# Patient Record
Sex: FEMALE | Race: WHITE | NOT HISPANIC OR LATINO | Employment: OTHER | URBAN - METROPOLITAN AREA
[De-identification: names, ages, dates, MRNs, and addresses within clinical notes are randomized per-mention and may not be internally consistent; named-entity substitution may affect disease eponyms.]

---

## 2017-02-01 ENCOUNTER — APPOINTMENT (OUTPATIENT)
Dept: VASCULAR SURGERY | Facility: CLINIC | Age: 42
End: 2017-02-01
Payer: COMMERCIAL

## 2017-02-01 PROCEDURE — 99207 ZZC VEINSOLUTIONS NO CHARGE VISIT: CPT | Performed by: SURGERY

## 2017-03-15 ENCOUNTER — TRANSFERRED RECORDS (OUTPATIENT)
Dept: HEALTH INFORMATION MANAGEMENT | Facility: CLINIC | Age: 42
End: 2017-03-15
Payer: COMMERCIAL

## 2017-03-15 ENCOUNTER — APPOINTMENT (OUTPATIENT)
Dept: VASCULAR SURGERY | Facility: CLINIC | Age: 42
End: 2017-03-15
Payer: COMMERCIAL

## 2017-03-15 PROCEDURE — 76942 ECHO GUIDE FOR BIOPSY: CPT | Performed by: SURGERY

## 2017-03-15 PROCEDURE — 36471 NJX SCLRSNT MLT INCMPTNT VN: CPT | Performed by: SURGERY

## 2017-03-27 ENCOUNTER — OFFICE VISIT (OUTPATIENT)
Dept: FAMILY MEDICINE | Facility: CLINIC | Age: 42
End: 2017-03-27

## 2017-03-27 VITALS
OXYGEN SATURATION: 98 % | WEIGHT: 132 LBS | BODY MASS INDEX: 22.53 KG/M2 | HEIGHT: 64 IN | DIASTOLIC BLOOD PRESSURE: 70 MMHG | SYSTOLIC BLOOD PRESSURE: 110 MMHG | HEART RATE: 72 BPM | RESPIRATION RATE: 16 BRPM | TEMPERATURE: 98.3 F

## 2017-03-27 DIAGNOSIS — K42.9 UMBILICAL HERNIA WITHOUT OBSTRUCTION AND WITHOUT GANGRENE: Primary | ICD-10-CM

## 2017-03-27 DIAGNOSIS — Z71.89 ACP (ADVANCE CARE PLANNING): ICD-10-CM

## 2017-03-27 PROCEDURE — 99213 OFFICE O/P EST LOW 20 MIN: CPT | Performed by: FAMILY MEDICINE

## 2017-03-27 NOTE — NURSING NOTE
Patient is here for what she thinks may be a belly button hernia. She first noticed this about 2 wks ago.  Pre-Visit Screening not done today.  Pulse - regular  My Chart - accepts    CLASSIFICATION OF OVERWEIGHT AND OBESITY BY BMI                         Obesity Class           BMI(kg/m2)  Underweight                                    < 18.5  Normal                                         18.5-24.9  Overweight                                     25.0-29.9  OBESITY                     I                  30.0-34.9                              II                 35.0-39.9  EXTREME OBESITY             III                >40                             Patient's  BMI Body mass index is 22.66 kg/(m^2).  http://hin.nhlbi.nih.gov/menuplanner/menu.cgi  Questioned patient about current smoking habits.  Pt. has never smoked.

## 2017-03-27 NOTE — PROGRESS NOTES
SUBJECTIVE: 41 year old female complaining of generalized ache around her umbilicus for 6 week(s).   The patient describes worse when up and active/ gone when she sits or sleeps at night.   The patient denies a history of GI changes, weight change or fever.   Smoking history: No.   Relevant past medical history: positive for type 2 diabetes well controlled/ previous umbilical hernia repair.    OBJECTIVE: The patient appears healthy, alert, no distress, cooperative, smiling and jumps on exam table.   EARS: negative  NOSE/SINUS: Nares normal. Septum midline. Mucosa normal. No drainage or sinus tenderness.   THROAT: normal   NECK:Neck supple. No adenopathy. Thyroid symmetric, normal size,, Carotids without bruits.   CHEST: Clear  ABD: The abdomen is soft without tenderness, guarding, mass or organomegaly. Periumbical mild tenderness with deformity noted below the umbilicus. Bowel sounds are normal. No CVA tenderness or inguinal adenopathy noted.    ASSESSMENT: (K42.9) Umbilical hernia without obstruction and without gangrene  (primary encounter diagnosis)  Comment: options and worrisome symptoms reviewed  Plan: GENERAL SURG ADULT REFERRAL        Consultation at her timing    (Z71.89) ACP (advance care planning)  Plan: I have reviewed the patient's medical history in detail and updated the computerized patient record.

## 2017-03-27 NOTE — MR AVS SNAPSHOT
After Visit Summary   3/27/2017    Muriel Gibson    MRN: [id number]           Patient Information     Date Of Birth          1975        Visit Information        Provider Department      3/27/2017 1:45 PM Debbie Laurent MD [facility]        Today's Diagnoses     Umbilical hernia without obstruction and without gangrene    -  1    ACP (advance care planning)          Care Instructions    Schedule consultation        Follow-ups after your visit        Additional Services     GENERAL SURG ADULT REFERRAL       Your provider has referred you to: [facility]: [facility] [place] [phone]   [email or web address]    Please be aware that coverage of these services is subject to the terms and limitations of your health insurance plan.  Call member services at your health plan with any benefit or coverage questions.      Please bring the following with you to your appointment:    (1) Any X-Rays, CTs or MRIs which have been performed.  Contact the facility where they were done to arrange for  prior to your scheduled appointment.   (2) List of current medications   (3) This referral request   (4) Any documents/labs given to you for this referral                  Your next 10 appointments already scheduled     Apr 26, 2017 10:15 AM CDT   Assessment Injection with No Treatment with Kit Mccloud MD,  VEIN PROCEDURE ROOM 1   [facility]    [address], [address]  [facility] [id number] [phone]              Who to contact     If you have questions or need follow up information about today's clinic visit or your schedule please contact [facility] directly at [phone].  Normal or non-critical lab and imaging results will be communicated to you by MyChart, letter or phone within 4 business days after the clinic has received  "the results. If you do not hear from us within 7 days, please contact the clinic through [employer] or phone. If you have a critical or abnormal lab result, we will notify you by phone as soon as possible.  Submit refill requests through [employer] or call your pharmacy and they will forward the refill request to us. Please allow 3 business days for your refill to be completed.          Additional Information About Your Visit        [email or web address] Information     [employer] gives you secure access to your electronic health record. If you see a primary care provider, you can also send messages to your care team and make appointments. If you have questions, please call your primary care clinic.  If you do not have a primary care provider, please call [phone] and they will assist you.        Care EveryWhere ID     This is your Care EveryWhere ID. This could be used by other organizations to access your [facility] medical records  [id number]        Your Vitals Were     Pulse Temperature Respirations Height Last Period Pulse Oximetry    72 98.3  F (36.8  C) (Oral) 16 1.626 m (5' 4\") 03/06/2017 98%    BMI (Body Mass Index)                   22.66 kg/m2            Blood Pressure from Last 3 Encounters:   03/27/17 110/70   10/12/16 114/70   10/03/16 132/68    Weight from Last 3 Encounters:   03/27/17 59.9 kg (132 lb)   10/12/16 61.5 kg (135 lb 8 oz)   10/03/16 60.2 kg (132 lb 12.8 oz)              We Performed the Following     GENERAL SURG ADULT REFERRAL        Primary Care Provider Office Phone # Fax #    Fredi Gonzalez [phone]       [facility] [address]  [facility] [address]        Thank you!     Thank you for choosing [facility]  for your care. Our goal is always to provide you with excellent care. Hearing back from our patients is one way we can continue to improve our services. Please take a few minutes to complete the written survey that " you may receive in the mail after your visit with us. Thank you!             Your Updated Medication List - Protect others around you: Learn how to safely use, store and throw away your medicines at www.disposemymeds.org.          This list is accurate as of: 3/27/17  2:17 PM.  Always use your most recent med list.                   Brand Name Dispense Instructions for use    insulin lispro 100 UNIT/ML injection    HumaLOG     Inject  Subcutaneous 3 times daily (with meals). 3-5 units after breakfast and dinner, and 1-2 units after lunch per sliding scale BG       NovoLIN N PENFILL 100 UNIT/ML injection   Generic drug:  insulin NPH      Inject 19 Units Subcutaneous 2 times daily. In the morning and at bedtime.       ondansetron 4 MG ODT tab    ZOFRAN ODT    30 tablet    Take 1-2 tablets (4-8 mg) by mouth every 8 hours as needed for nausea       sertraline 50 MG tablet    ZOLOFT     Take 50 mg by mouth daily

## 2017-04-03 ENCOUNTER — OFFICE VISIT (OUTPATIENT)
Dept: SURGERY | Facility: CLINIC | Age: 42
End: 2017-04-03
Payer: COMMERCIAL

## 2017-04-03 VITALS
SYSTOLIC BLOOD PRESSURE: 120 MMHG | DIASTOLIC BLOOD PRESSURE: 80 MMHG | BODY MASS INDEX: 21.99 KG/M2 | HEART RATE: 81 BPM | HEIGHT: 65 IN | OXYGEN SATURATION: 100 % | WEIGHT: 132 LBS

## 2017-04-03 DIAGNOSIS — K42.9 RECURRENT UMBILICAL HERNIA: Primary | ICD-10-CM

## 2017-04-03 PROCEDURE — 99203 OFFICE O/P NEW LOW 30 MIN: CPT | Performed by: SURGERY

## 2017-04-03 ASSESSMENT — ENCOUNTER SYMPTOMS: ABDOMINAL PAIN: 1

## 2017-04-03 NOTE — PROGRESS NOTES
This is a 41-year-old patient known to me from a primary umbilical hernia repair done in 2006.  Over the last several weeks, the patient has noticed a lump just below and to the left of her umbilicus.  She is concerned that her hernia is back, or that she has a new one.  She did also have some right inguinal pain and after that some left inguinal pain.  Her inguinal pain has resolved.  She did not notice any lumps or bulges on either side of the groin.  The patient has had a fairly recent colonoscopy, which was negative.  She also had an upper GI study, which was also negative.  The patient notes that her periumbilical symptoms get worse when she eats a heavy meal.  The lump that she feels below the umbilicus is only intermittently present.    Past Medical History:   Diagnosis Date     Anemia      Diabetes mellitus (H)     Type I since age 25 yrs. old     Palpitations     PACs, PVCs     Past Surgical History:   Procedure Laterality Date     APPENDECTOMY      age 14 yrs.     COLONOSCOPY  9/12/2016    Dr. Blanton [facility]     COLONOSCOPY N/A 9/12/2016    Procedure: COLONOSCOPY;  Surgeon: Tanya Blanton MD;  Location: [facility]     HERNIA REPAIR  2006     Review of systems is negative for nausea or vomiting.  Positive for postprandial periumbilical discomfort.  Negative for fever or chills.    Physical exam:   The patient is in no apparent distress.  Breathing is nonlabored.  The abdomen is soft and generally nontender.  There is some mild fullness below and to the left of the umbilicus.  This does not change significantly with Valsalva.  There is a question of a bulge which increases with coughing, though this is very subtle.  There is a palpably patent umbilical ring, suggesting at least a small recurrence of her umbilical hernia.  The skin appears normal.    Assessment and plan: The patient has a bulge or lump just below and to the left of her previous umbilical primary repair.  It is not entirely clear if this  represents recurrent hernia, a new hernia or perhaps a muscular or fatty lump.  I did offer the patient repair of her recurrent umbilical hernia, though I'm not sure that's causing her acute symptoms.  Her symptoms have really only been significant over the last couple of weeks, and I think reexamine her in two weeks might be helpful.  At this point, I'm not sure that the CT scan would add much, though if the patient's symptoms change or progress, that might be reasonable.  The patient will return to see me in two weeks.    Star Ash MD  [facility]    Please route or send letter to:  Referring Provider

## 2017-04-03 NOTE — MR AVS SNAPSHOT
After Visit Summary   4/3/2017    Muriel Gbison    MRN: [id number]           Patient Information     Date Of Birth          1975        Visit Information        Provider Department      4/3/2017 9:30 AM Star Ash MD [facility]      Today's Diagnoses     Recurrent umbilical hernia    -  1       Follow-ups after your visit        Your next 10 appointments already scheduled     Apr 17, 2017  9:30 AM CDT   Return Visit with Star Ash MD   [facility]    [address]  [facility] [id number] [phone]            Apr 26, 2017 10:15 AM CDT   Assessment Injection with No Treatment with Kit Mccloud MD,  VEIN PROCEDURE ROOM 1   [facility]    [address]  [facility] [id number] [phone]              Who to contact     If you have questions or need follow up information about today's clinic visit or your schedule please contact [facility] directly at [phone].  Normal or non-critical lab and imaging results will be communicated to you by [facility], letter or phone within 4 business days after the clinic has received the results. If you do not hear from us within 7 days, please contact the clinic through [facility] or phone. If you have a critical or abnormal lab result, we will notify you by phone as soon as possible.  Submit refill requests through [employer] or call your pharmacy and they will forward the refill request to us. Please allow 3 business days for your refill to be completed.          Additional Information About Your Visit        MyChart Information     [employer] gives you secure access to your electronic health record. If you see a primary care provider, you can also send messages to your care team and make appointments. If  "you have questions, please call your primary care clinic.  If you do not have a primary care provider, please call [phone] and they will assist you.        Care EveryWhere ID     This is your Care EveryWhere ID. This could be used by other organizations to access your [facility] medical records  [id number]        Your Vitals Were     Pulse Height Last Period Pulse Oximetry Breastfeeding? BMI (Body Mass Index)    81 5' 5\" (1.651 m) 03/06/2017 100% No 21.97 kg/m2       Blood Pressure from Last 3 Encounters:   04/03/17 120/80   03/27/17 110/70   10/12/16 114/70    Weight from Last 3 Encounters:   04/03/17 132 lb (59.9 kg)   03/27/17 132 lb (59.9 kg)   10/12/16 135 lb 8 oz (61.5 kg)              Today, you had the following     No orders found for display       Primary Care Provider Office Phone # Fax #    Fredi Gonzalez [phone]       [facility] [address]  [facility] [address]        Thank you!     Thank you for choosing [facility]  for your care. Our goal is always to provide you with excellent care. Hearing back from our patients is one way we can continue to improve our services. Please take a few minutes to complete the written survey that you may receive in the mail after your visit with us. Thank you!             Your Updated Medication List - Protect others around you: Learn how to safely use, store and throw away your medicines at www.disposemymeds.org.          This list is accurate as of: 4/3/17 11:50 AM.  Always use your most recent med list.                   Brand Name Dispense Instructions for use    insulin lispro 100 UNIT/ML injection    HumaLOG     Inject  Subcutaneous 3 times daily (with meals). 3-5 units after breakfast and dinner, and 1-2 units after lunch per sliding scale BG       NovoLIN N PENFILL 100 UNIT/ML injection   Generic drug:  insulin NPH      Inject 19 Units Subcutaneous 2 times daily. In the " morning and at bedtime.       ondansetron 4 MG ODT tab    ZOFRAN ODT    30 tablet    Take 1-2 tablets (4-8 mg) by mouth every 8 hours as needed for nausea       sertraline 50 MG tablet    ZOLOFT     Take 50 mg by mouth daily

## 2017-04-03 NOTE — LETTER
April 3, 2017    RE:  Muriel Gibson-:  [birth year]    This is a 41-year-old patient known to me from a primary umbilical hernia repair done in . Over the last several weeks, the patient has noticed a lump just below and to the left of her umbilicus. She is concerned that her hernia is back, or that she has a new one. She did also have some right inguinal pain and after that some left inguinal pain. Her inguinal pain has resolved. She did not notice any lumps or bulges on either side of the groin. The patient has had a fairly recent colonoscopy, which was negative. She also had an upper GI study, which was also negative. The patient notes that her periumbilical symptoms get worse when she eats a heavy meal. The lump that she feels below the umbilicus is only intermittently present.     Review of systems is negative for nausea or vomiting. Positive for postprandial periumbilical discomfort. Negative for fever or chills.     Physical exam:   The patient is in no apparent distress.  Breathing is nonlabored.  The abdomen is soft and generally nontender. There is some mild fullness below and to the left of the umbilicus. This does not change significantly with Valsalva. There is a question of a bulge which increases with coughing, though this is very subtle. There is a palpably patent umbilical ring, suggesting at least a small recurrence of her umbilical hernia.  The skin appears normal.     Assessment and plan: The patient has a bulge or lump just below and to the left of her previous umbilical primary repair. It is not entirely clear if this represents recurrent hernia, a new hernia or perhaps a muscular or fatty lump. I did offer the patient repair of her recurrent umbilical hernia, though I'm not sure that's causing her acute symptoms. Her symptoms have really only been significant over the last couple of weeks, and I think reexamine her in two weeks might be helpful. At this point, I'm not sure that the CT scan  would add much, though if the patient's symptoms change or progress, that might be reasonable. The patient will return to see me in two weeks.     Star Ash MD  [facility]

## 2017-04-03 NOTE — PROGRESS NOTES
HPI      ROS (Review of Systems):      Positive for diabetes and DM controlled with Insulin.   GASTROINTESTINAL: Positive for abdominal pain.          Physical Exam

## 2017-04-17 ENCOUNTER — OFFICE VISIT (OUTPATIENT)
Dept: SURGERY | Facility: CLINIC | Age: 42
End: 2017-04-17
Payer: COMMERCIAL

## 2017-04-17 VITALS
SYSTOLIC BLOOD PRESSURE: 100 MMHG | WEIGHT: 132 LBS | HEART RATE: 67 BPM | BODY MASS INDEX: 21.99 KG/M2 | DIASTOLIC BLOOD PRESSURE: 62 MMHG | HEIGHT: 65 IN

## 2017-04-17 DIAGNOSIS — K42.9 RECURRENT UMBILICAL HERNIA: Primary | ICD-10-CM

## 2017-04-17 PROCEDURE — 99212 OFFICE O/P EST SF 10 MIN: CPT | Performed by: SURGERY

## 2017-04-17 NOTE — LETTER
2017    RE:  Muriel Gibson-:  [birth year]    The patient returns to follow-up regarding her periumbilical discomfort. Her inguinal regions have given her no further problems. She continues to have some discomfort below the umbilicus on both sides, worse with eating. She has not noticed any particular bulging at the umbilicus.     Examination today again reveals a very tiny recurrent umbilical hernia. This has a minimal bulge. The remainder of the area where the patient feels some swelling does not appear to be a hernia. This seems more consistent with a muscular issue.     The patient is somewhat concerned about her upcoming trip to [place]. I think it is very unlikely that any of the issue she is describing we'll give her any trouble traveling. With such a small recurrent umbilical hernia, it is certainly reasonable simply to observe this. She may return to see me if she would like to consider having that fixed in the future.     Star Ash MD  [facility]

## 2017-04-17 NOTE — PROGRESS NOTES
The patient returns to follow-up regarding her periumbilical discomfort.  Her inguinal regions have given her no further problems.  She continues to have some discomfort below the umbilicus on both sides, worse with eating.  She has not noticed any particular bulging at the umbilicus.    Examination today again reveals a very tiny recurrent umbilical hernia.  This has a minimal bulge.  The remainder of the area where the patient feels some swelling does not appear to be a hernia.  This seems more consistent with a muscular issue.    The patient is somewhat concerned about her upcoming trip to [place].  I think it is very unlikely that any of the issue she is describing we'll give her any trouble traveling.  With such a small recurrent umbilical hernia, it is certainly reasonable simply to observe this.  She may return to see me if she would like to consider having that fixed in the future.    Star Ash MD  [facility]    Please route or send letter to:  Primary Care Provider (PCP)

## 2017-04-17 NOTE — MR AVS SNAPSHOT
After Visit Summary   4/17/2017    Muriel Gibson    MRN: [id number]           Patient Information     Date Of Birth          1975        Visit Information        Provider Department      4/17/2017 9:30 AM Star Ash MD [facility]      Today's Diagnoses     Recurrent umbilical hernia    -  1       Follow-ups after your visit        Your next 10 appointments already scheduled     Apr 26, 2017 10:15 AM CDT   Assessment Injection with No Treatment with Kit Mccloud MD,  VEIN PROCEDURE ROOM 1   [facility]    [address]  [facility] [address] [phone]              Who to contact     If you have questions or need follow up information about today's clinic visit or your schedule please contact [facility] directly at [phone].  Normal or non-critical lab and imaging results will be communicated to you by MyChart, letter or phone within 4 business days after the clinic has received the results. If you do not hear from us within 7 days, please contact the clinic through [employer] or phone. If you have a critical or abnormal lab result, we will notify you by phone as soon as possible.  Submit refill requests through [employer] or call your pharmacy and they will forward the refill request to us. Please allow 3 business days for your refill to be completed.          Additional Information About Your Visit        MyChart Information     [employer] gives you secure access to your electronic health record. If you see a primary care provider, you can also send messages to your care team and make appointments. If you have questions, please call your primary care clinic.  If you do not have a primary care provider, please call [phone] and they will assist you.        Care EveryWhere ID     This is your Care EveryWhere ID. This  "could be used by other organizations to access your [facility] medical records  [id number]        Your Vitals Were     Pulse Height Last Period BMI (Body Mass Index)          67 5' 5\" (1.651 m) 03/06/2017 21.97 kg/m2         Blood Pressure from Last 3 Encounters:   04/17/17 100/62   04/03/17 120/80   03/27/17 110/70    Weight from Last 3 Encounters:   04/17/17 132 lb (59.9 kg)   04/03/17 132 lb (59.9 kg)   03/27/17 132 lb (59.9 kg)              Today, you had the following     No orders found for display       Primary Care Provider Office Phone # Fax #    Fredi Gonzalez [phone]       [facility] [address] [facility] [address]  [facility] [address]        Thank you!     Thank you for choosing [facility]  for your care. Our goal is always to provide you with excellent care. Hearing back from our patients is one way we can continue to improve our services. Please take a few minutes to complete the written survey that you may receive in the mail after your visit with us. Thank you!             Your Updated Medication List - Protect others around you: Learn how to safely use, store and throw away your medicines at www.disposemymeds.org.          This list is accurate as of: 4/17/17 10:55 AM.  Always use your most recent med list.                   Brand Name Dispense Instructions for use    insulin lispro 100 UNIT/ML injection    HumaLOG     Inject  Subcutaneous 3 times daily (with meals). 3-5 units after breakfast and dinner, and 1-2 units after lunch per sliding scale BG       NovoLIN N PENFILL 100 UNIT/ML injection   Generic drug:  insulin NPH      Inject 19 Units Subcutaneous 2 times daily. In the morning and at bedtime.       ondansetron 4 MG ODT tab    ZOFRAN ODT    30 tablet    Take 1-2 tablets (4-8 mg) by mouth every 8 hours as needed for nausea       sertraline 50 MG tablet    ZOLOFT     Take 50 mg by mouth daily         "

## 2017-04-19 ENCOUNTER — APPOINTMENT (OUTPATIENT)
Dept: VASCULAR SURGERY | Facility: CLINIC | Age: 42
End: 2017-04-19
Payer: COMMERCIAL

## 2017-04-19 ENCOUNTER — TRANSFERRED RECORDS (OUTPATIENT)
Dept: HEALTH INFORMATION MANAGEMENT | Facility: CLINIC | Age: 42
End: 2017-04-19

## 2017-04-19 PROCEDURE — 93971 EXTREMITY STUDY: CPT | Performed by: SURGERY

## 2017-04-19 PROCEDURE — 99207 ZZC VEINSOLUTIONS POST OPERATIVE VISIT: CPT | Performed by: SURGERY

## 2017-04-26 ENCOUNTER — APPOINTMENT (OUTPATIENT)
Dept: VASCULAR SURGERY | Facility: CLINIC | Age: 42
End: 2017-04-26

## 2017-04-26 PROCEDURE — 99207 ZZC VEINSOLUTIONS NO CHARGE VISIT: CPT | Performed by: SURGERY

## 2017-06-19 ENCOUNTER — APPOINTMENT (OUTPATIENT)
Dept: VASCULAR SURGERY | Facility: CLINIC | Age: 42
End: 2017-06-19

## 2017-06-19 PROCEDURE — A6533 GC STOCKING THIGHLNGTH 18-30: HCPCS | Performed by: SURGERY

## 2017-06-19 PROCEDURE — 99207 ZZC VEINSOLUTIONS NO CHARGE VISIT: CPT | Performed by: SURGERY

## 2017-07-01 ENCOUNTER — HEALTH MAINTENANCE LETTER (OUTPATIENT)
Age: 42
End: 2017-07-01

## 2017-11-21 ENCOUNTER — APPOINTMENT (OUTPATIENT)
Dept: VASCULAR SURGERY | Facility: CLINIC | Age: 42
End: 2017-11-21
Payer: COMMERCIAL

## 2017-11-21 ENCOUNTER — OFFICE VISIT (OUTPATIENT)
Dept: VASCULAR SURGERY | Facility: CLINIC | Age: 42
End: 2017-11-21
Payer: COMMERCIAL

## 2017-11-21 ENCOUNTER — TRANSFERRED RECORDS (OUTPATIENT)
Dept: HEALTH INFORMATION MANAGEMENT | Facility: CLINIC | Age: 42
End: 2017-11-21

## 2017-11-21 DIAGNOSIS — Z53.9 ERRONEOUS ENCOUNTER--DISREGARD: Primary | ICD-10-CM

## 2017-11-21 PROCEDURE — 99207 ZZC VEINSOLUTIONS NO CHARGE VISIT: CPT | Performed by: SURGERY

## 2017-11-21 PROCEDURE — 93971 EXTREMITY STUDY: CPT | Performed by: SURGERY

## 2017-11-21 PROCEDURE — 36471 NJX SCLRSNT MLT INCMPTNT VN: CPT | Mod: 50 | Performed by: SURGERY

## 2017-11-21 NOTE — MR AVS SNAPSHOT
After Visit Summary   11/21/2017    Muriel Gibson    MRN: [id number]           Patient Information     Date Of Birth          1975        Visit Information        Provider Department      11/21/2017 11:15 AM Kit Mccloud MD [facility]      Today's Diagnoses     ERRONEOUS ENCOUNTER--DISREGARD    -  1       Follow-ups after your visit        Who to contact     If you have questions or need follow up information about today's clinic visit or your schedule please contact [facility] directly at [phone].  Normal or non-critical lab and imaging results will be communicated to you by TotalTakeouthart, letter or phone within 4 business days after the clinic has received the results. If you do not hear from us within 7 days, please contact the clinic through TotalTakeouthart or phone. If you have a critical or abnormal lab result, we will notify you by phone as soon as possible.  Submit refill requests through Tailor Made Oil or call your pharmacy and they will forward the refill request to us. Please allow 3 business days for your refill to be completed.          Additional Information About Your Visit        MyChart Information     T[employer]or Made Oil gives you secure access to your electronic health record. If you see a primary care provider, you can also send messages to your care team and make appointments. If you have questions, please call your primary care clinic.  If you do not have a primary care provider, please call [phone] and they will assist you.        Care EveryWhere ID     This is your Care EveryWhere ID. This could be used by other organizations to access your [place] medical records  [id number]         Blood Pressure from Last 3 Encounters:   01/17/18 116/70   12/19/17 (!) 132/92   12/15/17 116/74    Weight from Last 3 Encounters:   01/17/18 64 kg (141 lb 3.2 oz)   12/19/17 62.3 kg (137 lb 6.4 oz)   12/15/17 62.7 kg  (138 lb 3.2 oz)              Today, you had the following     No orders found for display       Primary Care Provider Office Phone # Fax #    Fredi Gonzalez [phone] [address] [address]        Equal Access to Services     [facility]: Hadii aad ku hadasho Soomaali, waaxda luqadaha, qaybta kaalmada adeegyada, waxay marleniin hayaan gold juliarobert laleo . So [facility] [phone].    ATENCIÓN: Si habla español, tiene a albrecht disposición servicios gratuitos de asistencia lingüística. [facility] [phone].    We comply with applicable federal civil rights laws and [place] laws. We do not discriminate on the basis of race, color, national origin, age, disability, sex, sexual orientation, or gender identity.            Thank you!     Thank you for choosing [facility]  for your care. Our goal is always to provide you with excellent care. Hearing back from our patients is one way we can continue to improve our services. Please take a few minutes to complete the written survey that you may receive in the mail after your visit with us. Thank you!             Your Updated Medication List - Protect others around you: Learn how to safely use, store and throw away your medicines at www.disposemymeds.org.          This list is accurate as of 11/21/17 11:59 PM.  Always use your most recent med list.                   Brand Name Dispense Instructions for use Diagnosis    insulin lispro 100 UNIT/ML injection    HumaLOG     Inject  Subcutaneous 3 times daily (with meals). 3-5 units after breakfast and dinner, and 1-2 units after lunch per sliding scale BG        NovoLIN N PENFILL 100 UNIT/ML injection   Generic drug:  insulin NPH      Inject 19 Units Subcutaneous 2 times daily. In the morning and at bedtime.

## 2017-11-21 NOTE — PROGRESS NOTES
[facility] Procedure Note    Preprocedure diagnosis  Residual bilateral varicose veins with leg pain; status post radiofrequency ablation right great saphenous vein    Postprocedure diagnosis  Same    Procedure  Medically necessary, bilateral lower extremity sclerotherapy    Surgeon  MELISSA Mccloud M.D.    Procedure description  Details of the procedure including risks of allergic reaction, ulceration, hyperpigmentation, the vein thrombosis and matting had been discussed.  Patient appeared to understand and wishes to proceed.  Informed consent was obtained.    I had  lie supine and prone on our examining table and I injected numerous reticular veins and telangiectasias about both lateral legs, lateral thighs and to a lesser extent medial legs and thighs as well as the popliteal fossa is bilaterally using 0.5% Polidocanol foam, mixed in a one part Polidocanol in two parts air configuration..  We used a total of 12 mL of foam to treat to treat numerous bilateral telangiectasias and reticular veins.    After we completed the treatment, we cleaned the legs with saline solution and applied thigh-high compression hose.  We had her walk for 15 minutes prior to driving home.  She tolerated the procedure well without evidence of complications.    She will return in follow-up in one month.    MELISSA Mccloud M.D.

## 2017-12-11 ENCOUNTER — HOSPITAL ENCOUNTER (OUTPATIENT)
Dept: MAMMOGRAPHY | Facility: CLINIC | Age: 42
Discharge: HOME | End: 2017-12-11
Attending: OBSTETRICS & GYNECOLOGY | Admitting: OBSTETRICS & GYNECOLOGY
Payer: COMMERCIAL

## 2017-12-11 DIAGNOSIS — Z12.31 VISIT FOR SCREENING MAMMOGRAM: ICD-10-CM

## 2017-12-11 PROCEDURE — G0202 SCR MAMMO BI INCL CAD: HCPCS

## 2017-12-15 ENCOUNTER — TELEPHONE (OUTPATIENT)
Dept: FAMILY MEDICINE | Facility: CLINIC | Age: 42
End: 2017-12-15

## 2017-12-15 ENCOUNTER — OFFICE VISIT (OUTPATIENT)
Dept: FAMILY MEDICINE | Facility: CLINIC | Age: 42
End: 2017-12-15

## 2017-12-15 VITALS
HEART RATE: 68 BPM | WEIGHT: 138.2 LBS | BODY MASS INDEX: 23.6 KG/M2 | DIASTOLIC BLOOD PRESSURE: 74 MMHG | HEIGHT: 64 IN | SYSTOLIC BLOOD PRESSURE: 116 MMHG | OXYGEN SATURATION: 99 % | TEMPERATURE: 98.7 F

## 2017-12-15 DIAGNOSIS — R06.2 WHEEZING: Primary | ICD-10-CM

## 2017-12-15 PROCEDURE — 99213 OFFICE O/P EST LOW 20 MIN: CPT | Performed by: FAMILY MEDICINE

## 2017-12-15 RX ORDER — ALBUTEROL SULFATE 90 UG/1
2 AEROSOL, METERED RESPIRATORY (INHALATION) EVERY 6 HOURS PRN
Qty: 3 INHALER | Refills: 1 | Status: SHIPPED | OUTPATIENT
Start: 2017-12-15 | End: 2018-11-16

## 2017-12-15 NOTE — MR AVS SNAPSHOT
After Visit Summary   12/15/2017    Muriel Gibson    MRN: [id number]           Patient Information     Date Of Birth          1975        Visit Information        Provider Department      12/15/2017 1:00 PM Fredi Gonzalez MD [facility]        Today's Diagnoses     Wheezing    -  1       Follow-ups after your visit        Your next 10 appointments already scheduled     Dec 27, 2017 10:00 AM CST   Assessment Injection with Possible Treatment with Kit Mccloud MD   [facility]    [address] [place], [address]  [facility] [address] [phone]              Who to contact     If you have questions or need follow up information about today's clinic visit or your schedule please contact [facility] directly at [phone].  Normal or non-critical lab and imaging results will be communicated to you by MyChart, letter or phone within 4 business days after the clinic has received the results. If you do not hear from us within 7 days, please contact the clinic through NIMBOXXhart or phone. If you have a critical or abnormal lab result, we will notify you by phone as soon as possible.  Submit refill requests through Company Data Trees or call your pharmacy and they will forward the refill request to us. Please allow 3 business days for your refill to be completed.          Additional Information About Your Visit        MyChart Information     [employer] gives you secure access to your electronic health record. If you see a primary care provider, you can also send messages to your care team and make appointments. If you have questions, please call your primary care clinic.  If you do not have a primary care provider, please call [phone] and they will assist you.        Care EveryWhere ID     This is your Care EveryWhere ID. This could be used by other organizations to access your [place]  "medical records  [id number]        Your Vitals Were     Pulse Temperature Height Pulse Oximetry Breastfeeding? BMI (Body Mass Index)    68 98.7  F (37.1  C) (Oral) 1.626 m (5' 4\") 99% No 23.72 kg/m2       Blood Pressure from Last 3 Encounters:   12/15/17 116/74   04/17/17 100/62   04/03/17 120/80    Weight from Last 3 Encounters:   12/15/17 62.7 kg (138 lb 3.2 oz)   04/17/17 59.9 kg (132 lb)   04/03/17 59.9 kg (132 lb)              Today, you had the following     No orders found for display         Today's Medication Changes          These changes are accurate as of: 12/15/17  1:26 PM.  If you have any questions, ask your nurse or doctor.               Start taking these medicines.        Dose/Directions    albuterol 108 (90 BASE) MCG/ACT Inhaler   Commonly known as:  PROAIR HFA/PROVENTIL HFA/VENTOLIN HFA   Used for:  Wheezing   Started by:  Fredi Gonzalez MD        Dose:  2 puff   Inhale 2 puffs into the lungs every 6 hours as needed for shortness of breath / dyspnea or wheezing   Quantity:  3 Inhaler   Refills:  1            Where to get your medicines      These medications were sent to [facility] [address] [place] [address] AT [place] [address] & [address] [address]    Hours:  24-hours Phone:  [phone]     albuterol 108 (90 BASE) MCG/ACT Inhaler                Primary Care Provider Office Phone # Fax #    Fredi Gonzalez [phone] [address] [address]        Equal Access to Services     [facility]: Lisa Franks, waliv lulinda, milli kaalmada sathish, cherelle snell. So [facility] [phone].    ATENCIÓN: Si habla español, tiene a albrecht disposición servicios gratuitos de asistencia lingüística. Llame al [phone].    We comply with applicable federal civil rights laws and [place] laws. We do not discriminate on the basis of " race, color, national origin, age, disability, sex, sexual orientation, or gender identity.            Thank you!     Thank you for choosing [facility]  for your care. Our goal is always to provide you with excellent care. Hearing back from our patients is one way we can continue to improve our services. Please take a few minutes to complete the written survey that you may receive in the mail after your visit with us. Thank you!             Your Updated Medication List - Protect others around you: Learn how to safely use, store and throw away your medicines at www.disposemymeds.org.          This list is accurate as of: 12/15/17  1:26 PM.  Always use your most recent med list.                   Brand Name Dispense Instructions for use Diagnosis    albuterol 108 (90 BASE) MCG/ACT Inhaler    PROAIR HFA/PROVENTIL HFA/VENTOLIN HFA    3 Inhaler    Inhale 2 puffs into the lungs every 6 hours as needed for shortness of breath / dyspnea or wheezing    Wheezing       insulin lispro 100 UNIT/ML injection    HumaLOG     Inject  Subcutaneous 3 times daily (with meals). 3-5 units after breakfast and dinner, and 1-2 units after lunch per sliding scale BG        NovoLIN N PENFILL 100 UNIT/ML injection   Generic drug:  insulin NPH      Inject 19 Units Subcutaneous 2 times daily. In the morning and at bedtime.        ondansetron 4 MG ODT tab    ZOFRAN ODT    30 tablet    Take 1-2 tablets (4-8 mg) by mouth every 8 hours as needed for nausea    Nausea       sertraline 50 MG tablet    ZOLOFT     Take 50 mg by mouth daily

## 2017-12-15 NOTE — TELEPHONE ENCOUNTER
Medical records request to [facility].. Request faxed 12/15/17. Waiting on records

## 2017-12-15 NOTE — NURSING NOTE
Muriel is here for breathing issues she has had since a recent cold, pt states she has been getting light headed and dizzy, and has felt pressure in her chest, pt also states she had borderline asthma but hasnt seen her asthma dr in 5 years    Pre-Visit Screening :  Immunizations : not up to date - pt would like to postpone her vaccinations until spring    Colonoscopy : na  Mammogram : is up to date  Asthma Action Test/Plan : given act  PHQ9/GAD7 :  Na    Pulse - regular  My Chart - accepts    CLASSIFICATION OF OVERWEIGHT AND OBESITY BY BMI                         Obesity Class           BMI(kg/m2)  Underweight                                    < 18.5  Normal                                         18.5-24.9  Overweight                                     25.0-29.9  OBESITY                     I                  30.0-34.9                              II                 35.0-39.9  EXTREME OBESITY             III                >40                             Patient's  BMI Body mass index is 22.15 kg/(m^2).  http://hin.nhlbi.nih.gov/menuplanner/menu.cgi  Questioned patient about current smoking habits.  Pt. has never smoked.  The patient has verbalized that it is ok to leave a detailed voice message on the patient's cell phone with results/recommendations from this visit.       Verified [phone] phone number:

## 2017-12-15 NOTE — PROGRESS NOTES
SUBJECTIVE:   Muriel Gibson is a 42 year old female who complains of chest tightness and wheezing for 6-7 days. She had a cold with nasal congestion prior but most of those symptoms have resolved. She denies a history of productive cough, sweats, chills, myalgias and vomiting and admits to a history of very mild intermittent asthma-saw allergy/asthma 6 years ago or so- was told very mild asthma-has not needed meds since. . Patient does not smoke cigarettes.     OBJECTIVE:  She appears well, vital signs are as noted by the nurse. Ears normal.  Throat and pharynx normal.  Neck supple. No adenopathy in the neck. Nose is congested. Sinuses non tender. The chest is clear, without wheezes or rales.    ASSESSMENT:   Bronchospasm and Viral upper respiratory illness-pt with hx asthma so likely has had some bronchospasm    PLAN:pt recommended to go back to prn albuterol use, call if not better in next few days    patient given instructions to go to emergency department immediately if worsening of symptoms and verbalizes this understanding       Symptomatic therapy suggested: push fluids and rest. Call or return to clinic prn if these symptoms worsen or fail to improve as anticipated.

## 2017-12-16 ASSESSMENT — ASTHMA QUESTIONNAIRES: ACT_TOTALSCORE: 11

## 2017-12-18 ENCOUNTER — TELEPHONE (OUTPATIENT)
Dept: FAMILY MEDICINE | Facility: CLINIC | Age: 42
End: 2017-12-18

## 2017-12-18 NOTE — TELEPHONE ENCOUNTER
No appt available with [facility] for Tuesday  Pt scheduled with [facility] tomorrow  Emperatriz  [phone] (home) none (work)

## 2017-12-18 NOTE — TELEPHONE ENCOUNTER
With pain I do worry about pneumonia-  I would prefer she be seen for CXR and vitals check including O2 sats-need to know what we are treating    Please inform patient that I am concerned by these symptoms -do not feel comfortable just calling something in

## 2017-12-18 NOTE — TELEPHONE ENCOUNTER
pt called the clinical support line with the following;    Pt saw you last Friday. Pt called to say that she is not feeling any better. Pt states that it hurts in her chest/ front and back. Pt states that she is coughing up phelgm. I encouraged pt to make an appt today, pt refused. Pt aware that you are gone today and insisted I send a note to you. Pt states that you had told that if she was not feeling better, to call the office and you would prescribe a medication for her. Please call pt if she needs to be seen.   Emperatriz  [phone] (home) none (work)    Please send to nurse if you need further assistance, for I will be gone the rest of the week

## 2017-12-19 ENCOUNTER — OFFICE VISIT (OUTPATIENT)
Dept: FAMILY MEDICINE | Facility: CLINIC | Age: 42
End: 2017-12-19

## 2017-12-19 VITALS
OXYGEN SATURATION: 99 % | WEIGHT: 137.4 LBS | TEMPERATURE: 98.3 F | SYSTOLIC BLOOD PRESSURE: 132 MMHG | BODY MASS INDEX: 23.46 KG/M2 | HEART RATE: 95 BPM | HEIGHT: 64 IN | DIASTOLIC BLOOD PRESSURE: 92 MMHG

## 2017-12-19 DIAGNOSIS — J20.9 ACUTE BRONCHITIS, UNSPECIFIED ORGANISM: Primary | ICD-10-CM

## 2017-12-19 PROCEDURE — 99213 OFFICE O/P EST LOW 20 MIN: CPT | Performed by: PHYSICIAN ASSISTANT

## 2017-12-19 RX ORDER — BENZONATATE 100 MG/1
100 CAPSULE ORAL 3 TIMES DAILY PRN
Qty: 30 CAPSULE | Refills: 0 | Status: SHIPPED | OUTPATIENT
Start: 2017-12-19 | End: 2018-01-17

## 2017-12-19 RX ORDER — AMOXICILLIN 500 MG/1
500 CAPSULE ORAL 2 TIMES DAILY
Qty: 20 CAPSULE | Refills: 0 | Status: SHIPPED | OUTPATIENT
Start: 2017-12-19 | End: 2017-12-29

## 2017-12-19 NOTE — MR AVS SNAPSHOT
After Visit Summary   12/19/2017    Muriel Gibson    MRN: [id number]           Patient Information     Date Of Birth          1975        Visit Information        Provider Department      12/19/2017 10:30 AM Fernanda Claudio PA-C [facility]        Today's Diagnoses     Acute bronchitis, unspecified organism    -  1       Follow-ups after your visit        Follow-up notes from your care team     Return if symptoms worsen or fail to improve.      Your next 10 appointments already scheduled     Dec 27, 2017 10:00 AM CST   Assessment Injection with Possible Treatment with Kit Mccloud MD   [facility]    [address], [address]  [facility] [address] [phone]              Who to contact     If you have questions or need follow up information about today's clinic visit or your schedule please contact [facility] directly at [phone].  Normal or non-critical lab and imaging results will be communicated to you by Saratahart, letter or phone within 4 business days after the clinic has received the results. If you do not hear from us within 7 days, please contact the clinic through Hyper Urban Level User Swedent or phone. If you have a critical or abnormal lab result, we will notify you by phone as soon as possible.  Submit refill requests through [email or web address] or call your pharmacy and they will forward the refill request to us. Please allow 3 business days for your refill to be completed.          Additional Information About Your Visit        MyChart Information     [email or web address] gives you secure access to your electronic health record. If you see a primary care provider, you can also send messages to your care team and make appointments. If you have questions, please call your primary care clinic.  If you do not have a primary care provider, please call [phone] and they will assist you.        Care  "EveryWhere ID     This is your Care EveryWhere ID. This could be used by other organizations to access your [place] medical records  [id number]        Your Vitals Were     Pulse Temperature Height Last Period Pulse Oximetry Breastfeeding?    95 98.3  F (36.8  C) (Oral) 1.626 m (5' 4\") 11/25/2017 (Exact Date) 99% No    BMI (Body Mass Index)                   23.58 kg/m2            Blood Pressure from Last 3 Encounters:   12/19/17 (!) 132/92   12/15/17 116/74   04/17/17 100/62    Weight from Last 3 Encounters:   12/19/17 62.3 kg (137 lb 6.4 oz)   12/15/17 62.7 kg (138 lb 3.2 oz)   04/17/17 59.9 kg (132 lb)              Today, you had the following     No orders found for display         Today's Medication Changes          These changes are accurate as of: 12/19/17  6:23 PM.  If you have any questions, ask your nurse or doctor.               Start taking these medicines.        Dose/Directions    amoxicillin 500 MG capsule   Commonly known as:  AMOXIL   Used for:  Acute bronchitis, unspecified organism   Started by:  Fernanda Claudio PA-C        Dose:  500 mg   Take 1 capsule (500 mg) by mouth 2 times daily for 10 days   Quantity:  20 capsule   Refills:  0       benzonatate 100 MG capsule   Commonly known as:  TESSALON   Used for:  Acute bronchitis, unspecified organism   Started by:  Fernanda Claudio PA-C        Dose:  100 mg   Take 1 capsule (100 mg) by mouth 3 times daily as needed   Quantity:  30 capsule   Refills:  0            Where to get your medicines      These medications were sent to [facility] [address] [facility] [address] AT [facility] [address] & [address], [facility] [address]    Hours:  24-hours Phone:  [phone]     amoxicillin 500 MG capsule    benzonatate 100 MG capsule                Primary Care Provider Office Phone # Fax #    Fredi Gonzalez [phone] [address] [address]   "      Equal Access to Services     [facility] : Hadii kaushik singh lisydeneen Golden, wahoangda luqadaha, qaybta katierneycherelle krishnamurthy. So [facility] [phone].    ATENCIÓN: Si habla español, tiene a albrecht disposición servicios gratuitos de asistencia lingüística. Llame al [phone].    We comply with applicable federal civil rights laws and [place] laws. We do not discriminate on the basis of race, color, national origin, age, disability, sex, sexual orientation, or gender identity.            Thank you!     Thank you for choosing [facility]  for your care. Our goal is always to provide you with excellent care. Hearing back from our patients is one way we can continue to improve our services. Please take a few minutes to complete the written survey that you may receive in the mail after your visit with us. Thank you!             Your Updated Medication List - Protect others around you: Learn how to safely use, store and throw away your medicines at www.disposemymeds.org.          This list is accurate as of: 12/19/17  6:23 PM.  Always use your most recent med list.                   Brand Name Dispense Instructions for use Diagnosis    albuterol 108 (90 BASE) MCG/ACT Inhaler    PROAIR HFA/PROVENTIL HFA/VENTOLIN HFA    3 Inhaler    Inhale 2 puffs into the lungs every 6 hours as needed for shortness of breath / dyspnea or wheezing    Wheezing       amoxicillin 500 MG capsule    AMOXIL    20 capsule    Take 1 capsule (500 mg) by mouth 2 times daily for 10 days    Acute bronchitis, unspecified organism       benzonatate 100 MG capsule    TESSALON    30 capsule    Take 1 capsule (100 mg) by mouth 3 times daily as needed    Acute bronchitis, unspecified organism       insulin lispro 100 UNIT/ML injection    HumaLOG     Inject  Subcutaneous 3 times daily (with meals). 3-5 units after breakfast and dinner, and 1-2 units after lunch per sliding scale BG        NovoLIN N  PENFILL 100 UNIT/ML injection   Generic drug:  insulin NPH      Inject 19 Units Subcutaneous 2 times daily. In the morning and at bedtime.        ondansetron 4 MG ODT tab    ZOFRAN ODT    30 tablet    Take 1-2 tablets (4-8 mg) by mouth every 8 hours as needed for nausea    Nausea       sertraline 50 MG tablet    ZOLOFT     Take 50 mg by mouth daily

## 2017-12-19 NOTE — PROGRESS NOTES
"CC: Cough    History:  2-3 weeks ago, Muriel developed a cold with chest congestion. Was using Mucinex. Saw Dr. Gonzalez 12/15/2017, and was thought to be mostly asthma. Prescribed albuterol inhaler, which she has been trying, and cough seems to be getting worse. Mostly dry coughs during the day, but at night has a lot of drainage, which leads to wet cough. No nasal congestion, no facial pain. Is still using using Mucinex DM, without relief. No fever, sweats, chills. In general does not need albuterol at all when healthy. Nobody around her with these symptoms.     PMH, MEDICATIONS, ALLERGIES, SOCIAL AND FAMILY HISTORY in [facility] and reviewed by me personally.      ROS negative other than the symptoms noted above in the HPI.        Examination   BP (!) 132/92 (BP Location: Right arm, Patient Position: Chair, Cuff Size: Adult Regular)  Pulse 95  Temp 98.3  F (36.8  C) (Oral)  Ht 1.626 m (5' 4\")  Wt 62.3 kg (137 lb 6.4 oz)  LMP 11/25/2017 (Exact Date)  SpO2 99%  Breastfeeding? No  BMI 23.58 kg/m2       Constitutional: Sitting comfortably, in no acute distress. Vital signs noted. BP mildly elevated  Eyes: pupils equal round reactive to light and accomodation, extra ocular movements intact  Ears: external canals and TMs free of abnormalities  Nose: patent, without mucosal abnormalities  Mouth and throat: without erythema or lesions of the mucosa  Neck:  no adenopathy, trachea midline and normal to palpation  Cardiovascular:  regular rate and rhythm, no murmurs, clicks, or gallops  Respiratory:  normal respiratory rate and rhythm, lungs clear to auscultation  SKIN: No jaundice/pallor/rash.   Psychiatric: mentation appears normal and affect normal/bright        A/P    ICD-10-CM    1. Acute bronchitis, unspecified organism J20.9 benzonatate (TESSALON) 100 MG capsule     amoxicillin (AMOXIL) 500 MG capsule       DISCUSSION: Elected not to do CXR as lungs sound clear, and likely will not change treatment plan. Muriel is " hesitant to try doxycycline for sinus/bronchitis, as she has not taken that before, instead would like to do amoxicillin which she has done in the past. Warned her that amoxicillin will help sinus bacteria, but may not be helping if this is bacteria bronchitis. She understands this. Warned of side effects, and take with food. Recommended trial of Tessalon perles to help with cough. Warned of drowsiness. Contact me in 1 week if not significantly better.     follow up visit: As needed    Fernanda Claudio PA-C  [facility]

## 2017-12-27 ENCOUNTER — APPOINTMENT (OUTPATIENT)
Dept: VASCULAR SURGERY | Facility: CLINIC | Age: 42
End: 2017-12-27
Payer: COMMERCIAL

## 2017-12-27 ENCOUNTER — TELEPHONE (OUTPATIENT)
Dept: FAMILY MEDICINE | Facility: CLINIC | Age: 42
End: 2017-12-27

## 2017-12-27 PROCEDURE — 99207 ZZC VEINSOLUTIONS NO CHARGE VISIT: CPT | Performed by: SURGERY

## 2017-12-27 NOTE — TELEPHONE ENCOUNTER
Muriel called CS today and requested a note for her to have to take with her for jury duty. Pt said she needed a note for her albuterol inhaler so she could have it with her.    Is requesting either Dr. Gonzalez or Fernanda to write it and call her back when its complete at either [phone] or [phone]

## 2017-12-27 NOTE — LETTER
[facility]  [facility] [address]  [place]  [address]    December 27, 2017        RE: Muriel Gibson  [address] [facility] [address]    Muriel Gibson is followed for her medical needs at this clinic.  She will need to carry an albuterol inhaler with her to jury duty.    Please feel free to contact our office if you have further questions.        CORTES Gonzalez M.D.

## 2018-01-04 ENCOUNTER — TRANSFERRED RECORDS (OUTPATIENT)
Dept: FAMILY MEDICINE | Facility: CLINIC | Age: 43
End: 2018-01-04

## 2018-01-17 ENCOUNTER — OFFICE VISIT (OUTPATIENT)
Dept: FAMILY MEDICINE | Facility: CLINIC | Age: 43
End: 2018-01-17

## 2018-01-17 VITALS
BODY MASS INDEX: 24.24 KG/M2 | HEART RATE: 73 BPM | TEMPERATURE: 98.2 F | OXYGEN SATURATION: 98 % | DIASTOLIC BLOOD PRESSURE: 70 MMHG | WEIGHT: 141.2 LBS | SYSTOLIC BLOOD PRESSURE: 116 MMHG

## 2018-01-17 DIAGNOSIS — R07.1 PAINFUL RESPIRATION: ICD-10-CM

## 2018-01-17 DIAGNOSIS — R05.9 COUGH: Primary | ICD-10-CM

## 2018-01-17 PROCEDURE — 99213 OFFICE O/P EST LOW 20 MIN: CPT | Performed by: FAMILY MEDICINE

## 2018-01-17 PROCEDURE — 71046 X-RAY EXAM CHEST 2 VIEWS: CPT | Performed by: FAMILY MEDICINE

## 2018-01-17 RX ORDER — BENZONATATE 100 MG/1
100 CAPSULE ORAL 3 TIMES DAILY PRN
Qty: 30 CAPSULE | Refills: 0 | Status: SHIPPED | OUTPATIENT
Start: 2018-01-17 | End: 2018-11-16

## 2018-01-17 NOTE — NURSING NOTE
Muriel is here for chest pain radiating to her back with some SOB    Pre-visit Screening:  Immunizations:  unknown  Colonoscopy:  NA  Mammogram: NA  Asthma Action Test/Plan:  Borderline  PHQ9:  NA  GAD7:  NA  Questioned patient about current smoking habits Pt. has never smoked.  Ok to leave detailed message on voice mail for today's visit only Yes, phone # [phone] Home

## 2018-01-17 NOTE — MR AVS SNAPSHOT
After Visit Summary   1/17/2018    Muriel Gibson    MRN: [id number]           Patient Information     Date Of Birth          1975        Visit Information        Provider Department      1/17/2018 2:15 PM Fredi Gonzalez MD [facility]        Today's Diagnoses     Cough    -  1    Painful respiration           Follow-ups after your visit        Who to contact     If you have questions or need follow up information about today's clinic visit or your schedule please contact [facility] directly at [phone].  Normal or non-critical lab and imaging results will be communicated to you by [employer], letter or phone within 4 business days after the clinic has received the results. If you do not hear from us within 7 days, please contact the clinic through [employer] or phone. If you have a critical or abnormal lab result, we will notify you by phone as soon as possible.  Submit refill requests through [employer] or call your pharmacy and they will forward the refill request to us. Please allow 3 business days for your refill to be completed.          Additional Information About Your Visit        MyChart Information     [employer] gives you secure access to your electronic health record. If you see a primary care provider, you can also send messages to your care team and make appointments. If you have questions, please call your primary care clinic.  If you do not have a primary care provider, please call [phone] and they will assist you.        Care EveryWhere ID     This is your Care EveryWhere ID. This could be used by other organizations to access your [place] medical records  [id number]        Your Vitals Were     Pulse Temperature Last Period Pulse Oximetry BMI (Body Mass Index)       73 98.2  F (36.8  C) (Oral) 11/25/2017 (Exact Date) 98% 24.24 kg/m2        Blood Pressure from Last 3 Encounters:   01/17/18 116/70   12/19/17 (!) 132/92    12/15/17 116/74    Weight from Last 3 Encounters:   01/17/18 64 kg (141 lb 3.2 oz)   12/19/17 62.3 kg (137 lb 6.4 oz)   12/15/17 62.7 kg (138 lb 3.2 oz)              We Performed the Following     HC XRAY CHEST, 2 VIEWS          Where to get your medicines      These medications were sent to [facility] [address] [address] AT [facility] [address], [facility] [address]    Hours:  24-hours Phone:  [phone]     benzonatate 100 MG capsule          Primary Care Provider Office Phone # Fax #    Fredi Gonzalez [phone] [address] [address]        Equal Access to Services     KRANTHI FRANCOIS : Hadii kaushik ku hadasho Soomaali, waaxda luqadaha, qaybta kaalmada adeegyada, cherelle rodas haysagarn gold townsend . So [facility] [phone].    ATENCIÓN: Si habla español, tiene a albrecht disposición servicios gratuitos de asistencia lingüística. Llame al [phone].    We comply with applicable federal civil rights laws and [place] laws. We do not discriminate on the basis of race, color, national origin, age, disability, sex, sexual orientation, or gender identity.            Thank you!     Thank you for choosing [facility]  for your care. Our goal is always to provide you with excellent care. Hearing back from our patients is one way we can continue to improve our services. Please take a few minutes to complete the written survey that you may receive in the mail after your visit with us. Thank you!             Your Updated Medication List - Protect others around you: Learn how to safely use, store and throw away your medicines at www.disposemymeds.org.          This list is accurate as of: 1/17/18  2:41 PM.  Always use your most recent med list.                   Brand Name Dispense Instructions for use Diagnosis    albuterol 108 (90 BASE) MCG/ACT Inhaler    PROAIR HFA/PROVENTIL  HFA/VENTOLIN HFA    3 Inhaler    Inhale 2 puffs into the lungs every 6 hours as needed for shortness of breath / dyspnea or wheezing    Wheezing       benzonatate 100 MG capsule    TESSALON    30 capsule    Take 1 capsule (100 mg) by mouth 3 times daily as needed    Cough       insulin lispro 100 UNIT/ML injection    HumaLOG     Inject  Subcutaneous 3 times daily (with meals). 3-5 units after breakfast and dinner, and 1-2 units after lunch per sliding scale BG        NovoLIN N PENFILL 100 UNIT/ML injection   Generic drug:  insulin NPH      Inject 19 Units Subcutaneous 2 times daily. In the morning and at bedtime.

## 2018-01-17 NOTE — PROGRESS NOTES
SUBJECTIVE:   Muriel Gibson is a 42 year old female who complains of dry cough and cough described as paroxysmal for 4 weeks . Pt was seen here 12/15/17 and treated for asthma flare with inhalers, then again 12/19/17 and was started on amoxicillin for bronchitis (pt did not want to use other abx)-states got better other than cough but has developed painful respirations and chest pain mid anterior, sides, and back with cough in last 2 weeks. She denies a history of productive cough, sweats, chills, myalgias, shortness of breath and vomiting and admits to a history of asthma. Patient does not smoke cigarettes.    Patient Active Problem List   Diagnosis     DKA (diabetic ketoacidoses) (H)     CARDIOVASCULAR SCREENING; LDL GOAL LESS THAN 100     Type 1 diabetes, HbA1c goal < 7% (H)-Dr Colvin     [facility] [place]     Panic disorder     Basal cell carcinoma of back     Palpitations     ACP (advance care planning)     Past Medical History:   Diagnosis Date     Anemia      Diabetes mellitus (H)     Type I since age 25 yrs. old     Palpitations     PACs, PVCs     Family History   Problem Relation Age of Onset     Other Cancer Father      Hypertension Mother      Hyperlipidemia Mother      Thyroid Disease Mother      Other Cancer Maternal Grandfather      Colon Cancer No family hx of      Social History     Social History     Marital status:      Spouse name: N/A     Number of children: 1     Years of education: N/A     Occupational History     Not on file.     Social History Main Topics     Smoking status: Never Smoker     Smokeless tobacco: Never Used     Alcohol use No      Comment: very rarely     Drug use: No     Sexual activity: Yes     Other Topics Concern     Special Diet Yes     low carb due to diabetic     Exercise Yes     walking everyday     Social History Narrative     Past Surgical History:   Procedure Laterality Date     APPENDECTOMY      age 14 yrs.     COLONOSCOPY  9/12/2016    Dr. Harvinder SAMPSON      COLONOSCOPY N/A 9/12/2016    Procedure: COLONOSCOPY;  Surgeon: Tanya Blanton MD;  Location:  GI     HERNIA REPAIR  2006       Current Outpatient Prescriptions on File Prior to Visit:  albuterol (PROAIR HFA/PROVENTIL HFA/VENTOLIN HFA) 108 (90 BASE) MCG/ACT Inhaler Inhale 2 puffs into the lungs every 6 hours as needed for shortness of breath / dyspnea or wheezing   insulin NPH (NOVOLIN N PENFILL) 100 UNIT/ML injection Inject 19 Units Subcutaneous 2 times daily. In the morning and at bedtime.    insulin lispro (HUMALOG) 100 UNIT/ML injection Inject  Subcutaneous 3 times daily (with meals). 3-5 units after breakfast and dinner, and 1-2 units after lunch per sliding scale BG      No current facility-administered medications on file prior to visit.      Allergies: Azithromycin and Cefuroxime    Immunization History   Administered Date(s) Administered     TDAP Vaccine (Boostrix) 01/01/2009         OBJECTIVE:  She appears well, vital signs are as noted by the nurse. Ears normal.  Throat and pharynx normal.  Neck supple. No adenopathy in the neck. Nose is congested. Sinuses non tender. The chest is clear, without wheezes or rales.    Pt tender to palpation over anterior chest, lateral ribs, and even upper back    Skin: no rash, bruising    ASSESSMENT:   Pleurisy/costochondritis, recent coughing illness    PLAN:Nsaids regularly, OK for more tessalon  Symptomatic therapy suggested: push fluids and rest. Call or return to clinic prn if these symptoms worsen or fail to improve as anticipated.     Expect gradual improvement but can take weeks    Let me know if worsening

## 2018-04-18 ENCOUNTER — APPOINTMENT (OUTPATIENT)
Dept: VASCULAR SURGERY | Facility: CLINIC | Age: 43
End: 2018-04-18

## 2018-04-18 PROCEDURE — 99207 ZZC VEINSOLUTIONS NO CHARGE VISIT: CPT | Performed by: SURGERY

## 2018-11-16 ENCOUNTER — OFFICE VISIT (OUTPATIENT)
Dept: FAMILY MEDICINE | Facility: CLINIC | Age: 43
End: 2018-11-16

## 2018-11-16 VITALS
SYSTOLIC BLOOD PRESSURE: 120 MMHG | TEMPERATURE: 98.5 F | DIASTOLIC BLOOD PRESSURE: 76 MMHG | WEIGHT: 148 LBS | HEART RATE: 78 BPM | OXYGEN SATURATION: 99 % | BODY MASS INDEX: 25.4 KG/M2

## 2018-11-16 DIAGNOSIS — R06.2 WHEEZING: ICD-10-CM

## 2018-11-16 DIAGNOSIS — D23.21 DERMOID CYST OF EAR, RIGHT: Primary | ICD-10-CM

## 2018-11-16 PROCEDURE — 99213 OFFICE O/P EST LOW 20 MIN: CPT | Performed by: FAMILY MEDICINE

## 2018-11-16 RX ORDER — LANCETS
EACH MISCELLANEOUS
Refills: 3 | COMMUNITY
Start: 2018-06-07 | End: 2021-08-10

## 2018-11-16 RX ORDER — IBUPROFEN 600 MG/1
TABLET ORAL
Refills: 5 | COMMUNITY
Start: 2018-10-26 | End: 2021-08-10

## 2018-11-16 RX ORDER — SYRINGE-NEEDLE,INSULIN,0.5 ML 31 GX5/16"
SYRINGE, EMPTY DISPOSABLE MISCELLANEOUS
Refills: 3 | COMMUNITY
Start: 2018-10-29 | End: 2021-08-10

## 2018-11-16 RX ORDER — ALBUTEROL SULFATE 90 UG/1
2 AEROSOL, METERED RESPIRATORY (INHALATION) EVERY 6 HOURS PRN
Qty: 3 INHALER | Refills: 1 | Status: SHIPPED | OUTPATIENT
Start: 2018-11-16 | End: 2020-01-25

## 2018-11-16 RX ORDER — BLOOD SUGAR DIAGNOSTIC
STRIP MISCELLANEOUS
Refills: 3 | COMMUNITY
Start: 2018-10-26 | End: 2021-08-10

## 2018-11-16 NOTE — MR AVS SNAPSHOT
After Visit Summary   11/16/2018    Muriel Gibson    MRN: [id number]           Patient Information     Date Of Birth          1975        Visit Information        Provider Department      11/16/2018 10:45 AM Carmel Vicente MD [facility]        Today's Diagnoses     Dermoid cyst of ear, right    -  1    Wheezing           Follow-ups after your visit        Who to contact     If you have questions or need follow up information about today's clinic visit or your schedule please contact [facility] directly at [phone].  Normal or non-critical lab and imaging results will be communicated to you by [employer], letter or phone within 4 business days after the clinic has received the results. If you do not hear from us within 7 days, please contact the clinic through [employer] or phone. If you have a critical or abnormal lab result, we will notify you by phone as soon as possible.  Submit refill requests through [employer] or call your pharmacy and they will forward the refill request to us. Please allow 3 business days for your refill to be completed.          Additional Information About Your Visit        MyChart Information     [employer] gives you secure access to your electronic health record. If you see a primary care provider, you can also send messages to your care team and make appointments. If you have questions, please call your primary care clinic.  If you do not have a primary care provider, please call [phone] and they will assist you.        Care EveryWhere ID     This is your Care EveryWhere ID. This could be used by other organizations to access your [place] medical records  [id number]        Your Vitals Were     Pulse Temperature Last Period Pulse Oximetry BMI (Body Mass Index)       78 98.5  F (36.9  C) (Oral) 11/02/2018 (Exact Date) 99% 25.4 kg/m2        Blood Pressure from Last 3 Encounters:   11/16/18 120/76   01/17/18 116/70    12/19/17 (!) 132/92    Weight from Last 3 Encounters:   11/16/18 67.1 kg (148 lb)   01/17/18 64 kg (141 lb 3.2 oz)   12/19/17 62.3 kg (137 lb 6.4 oz)              Today, you had the following     No orders found for display         Today's Medication Changes          These changes are accurate as of 11/16/18 11:59 PM.  If you have any questions, ask your nurse or doctor.               Start taking these medicines.        Dose/Directions    amoxicillin-clavulanate 875-125 MG per tablet   Commonly known as:  AUGMENTIN   Used for:  Dermoid cyst of ear, right   Started by:  Carmel Vicente MD        Dose:  1 tablet   Take 1 tablet by mouth 2 times daily   Quantity:  14 tablet   Refills:  0            Where to get your medicines      These medications were sent to [facility] [id number] [place] [address] AT [facility] [address] [address], [facility] [address]    Hours:  24-hours Phone:  [phone]     albuterol 108 (90 Base) MCG/ACT inhaler    amoxicillin-clavulanate 875-125 MG per tablet                Primary Care Provider Office Phone # Fax #    Fredi Gonzalez [phone] [address] [address]        Equal Access to Services     [facility] ALESSANDRO AH: Hadii aad ku hadasho Soomaali, waaxda luqadaha, qaybta kaalmada adeegyada, cherelle rodas hayveronica snell. So [facility] [phone].    ATENCIÓN: Si habla español, tiene a albrecht disposición servicios gratuitos de asistencia lingüística. Llame al [phone].    We comply with applicable federal civil rights laws and [place] laws. We do not discriminate on the basis of race, color, national origin, age, disability, sex, sexual orientation, or gender identity.            Thank you!     Thank you for choosing [facility]  for your care. Our goal is always to provide you with excellent care. Hearing back from our patients is one way we can  "continue to improve our services. Please take a few minutes to complete the written survey that you may receive in the mail after your visit with us. Thank you!             Your Updated Medication List - Protect others around you: Learn how to safely use, store and throw away your medicines at www.disposemymeds.org.          This list is accurate as of 11/16/18 11:59 PM.  Always use your most recent med list.                   Brand Name Dispense Instructions for use Diagnosis    ACCU-CHEK SKY PLUS test strip   Generic drug:  blood glucose monitoring      USE TO TEST 6 TIME PER DAY        albuterol 108 (90 Base) MCG/ACT inhaler    PROAIR HFA/PROVENTIL HFA/VENTOLIN HFA    3 Inhaler    Inhale 2 puffs into the lungs every 6 hours as needed for shortness of breath / dyspnea or wheezing    Wheezing       amoxicillin-clavulanate 875-125 MG per tablet    AUGMENTIN    14 tablet    Take 1 tablet by mouth 2 times daily    Dermoid cyst of ear, right       B-D INSULIN SYRINGE 31G X 5/16\" 0.5 ML miscellaneous   Generic drug:  insulin syringe-needle U-100      USE TO TEST UP TO 5 TIMES D        GLUCAGON EMERGENCY 1 MG kit   Generic drug:  glucagon      INJECT 1ML IM ONCE. MAY REPEAT DOSE IN 15 MINUTES IF INADEQUATE RESPONE.        insulin lispro 100 UNIT/ML vial    HumaLOG VIAL     Inject  Subcutaneous 3 times daily (with meals). 3-5 units after breakfast and dinner, and 1-2 units after lunch per sliding scale BG        MICROLET LANCETS Misc      TESTING 7 TIMES DAILY        NovoLIN N PENFILL 100 UNIT/ML vial   Generic drug:  insulin NPH      Inject 19 Units Subcutaneous 2 times daily. In the morning and at bedtime.        sertraline 50 MG tablet    ZOLOFT            "

## 2018-11-16 NOTE — NURSING NOTE
Patient is here due to having a bump on the back of her right ear that is very painful and is now getting worse.    Pre-visit Screening:  Immunizations:  up to date  Colonoscopy:  NA  Mammogram: is up to date  Asthma Action Test/Plan:  Wants to get albuterol inhaler refilled-ACT given  PHQ9:  PHQ-2 done today   GAD7:  No concerns  Questioned patient about current smoking habits Pt. has never smoked.  Ok to leave detailed message on voice mail for today's visit only Yes, phone # [phone]

## 2018-11-16 NOTE — PROGRESS NOTES
SUBJECTIVE:  43 year old female presents with the following concern:    Painful lump behind her right ear since Sunday night (5 days ago)    Past medical history:  History of tinnitus, serous otitis- sees ENT for monitoring   Painful lump since Sunday night    Reactive airways- intermittent asthma  Uses Albuterol  November to March when she has upper respiratory infections     Diabetis- good control    Sertaline for anxiety-seasonal use in winter  History of panic attacks  Dose: 25 mg daily    Allergies to antibiotics- azithromycin and cefuroxine    OBJECTIVE:  /76 (BP Location: Left arm, Patient Position: Sitting, Cuff Size: Adult Regular)  Pulse 78  Temp 98.5  F (36.9  C) (Oral)  Wt 67.1 kg (148 lb)  LMP 11/02/2018 (Exact Date)  SpO2 99%  BMI 25.4 kg/m2  No acute distress  Area of erythema, swelling behind her right ear  Normal tympanic membrane bilaterally  Normal oral pharynx  Neck is supple- palpable right cervical lymphadenopathy    Assessment   (D23.21) Dermoid cyst of ear, right  (primary encounter diagnosis)  Comment: infected- may need I&D  Plan: amoxicillin-clavulanate (AUGMENTIN) 875-125 MG         per tablet        observation    (R06.2) Wheezing  Comment:  Requests refill of her albuterol- prn URI symptoms  Plan: albuterol (PROAIR HFA/PROVENTIL HFA/VENTOLIN         HFA) 108 (90 Base) MCG/ACT inhaler

## 2018-11-17 ASSESSMENT — ASTHMA QUESTIONNAIRES: ACT_TOTALSCORE: 18

## 2018-11-19 ENCOUNTER — TRANSFERRED RECORDS (OUTPATIENT)
Dept: FAMILY MEDICINE | Facility: CLINIC | Age: 43
End: 2018-11-19

## 2018-12-05 ENCOUNTER — TRANSFERRED RECORDS (OUTPATIENT)
Dept: FAMILY MEDICINE | Facility: CLINIC | Age: 43
End: 2018-12-05

## 2018-12-18 ENCOUNTER — TRANSFERRED RECORDS (OUTPATIENT)
Dept: FAMILY MEDICINE | Facility: CLINIC | Age: 43
End: 2018-12-18

## 2019-03-04 ENCOUNTER — HOSPITAL ENCOUNTER (OUTPATIENT)
Dept: MAMMOGRAPHY | Facility: CLINIC | Age: 44
Discharge: HOME | End: 2019-03-04
Attending: OBSTETRICS & GYNECOLOGY | Admitting: OBSTETRICS & GYNECOLOGY
Payer: COMMERCIAL

## 2019-03-04 DIAGNOSIS — Z12.31 VISIT FOR SCREENING MAMMOGRAM: ICD-10-CM

## 2019-03-04 PROCEDURE — 77063 BREAST TOMOSYNTHESIS BI: CPT

## 2019-04-14 NOTE — NURSING NOTE
"Muriel Gibson is here today for a cough x3 weeks.     Pre-visit Planning:    Immunizations -unknown  Colonoscopy -NA  Mammogram -is up to date (Performed 2 weeks ago)  Asthma test --NA  PHQ9 -is up to date  АЛЕКСАНДР 7 -is up to date      Vitals:    BP Cuff right  Arm with regular Cuff  PULSE regular  137 lbs 6.4 oz and 5' 4\"  CLASSIFICATION OF OVERWEIGHT AND OBESITY BY BMI                        Obesity Class           BMI(kg/m2)  Underweight                                    < 18.5  Normal                                         18.5-24.9  Overweight                                     25.0-29.9  OBESITY                     I                  30.0-34.9                             II                 35.0-39.9  EXTREME OBESITY             III                >40      Patient's  BMI Body mass index is 23.58 kg/(m^2).  Http://hin.nhlbi.nih.gov/menuplanner/menu.cgi    My Chart: - accepts     Tobacco Use: Questioned patient about current smoking habits.  Pt. has never smoked.    The patient has verbalized that it is ok to leave a detailed voice message on the patient's home voicemail with results/recommendations from this visit.     Verified Home Phone Number Today: [phone]    Radha Henriquez CMA    " Clear

## 2019-06-14 ENCOUNTER — TRANSFERRED RECORDS (OUTPATIENT)
Dept: FAMILY MEDICINE | Facility: CLINIC | Age: 44
End: 2019-06-14

## 2019-06-24 ENCOUNTER — OFFICE VISIT (OUTPATIENT)
Dept: FAMILY MEDICINE | Facility: CLINIC | Age: 44
End: 2019-06-24

## 2019-06-24 VITALS
WEIGHT: 151 LBS | DIASTOLIC BLOOD PRESSURE: 74 MMHG | HEART RATE: 75 BPM | SYSTOLIC BLOOD PRESSURE: 120 MMHG | BODY MASS INDEX: 25.92 KG/M2 | OXYGEN SATURATION: 98 % | TEMPERATURE: 98.4 F

## 2019-06-24 DIAGNOSIS — L80 VITILIGO: ICD-10-CM

## 2019-06-24 DIAGNOSIS — D72.829 LEUKOCYTOSIS, UNSPECIFIED TYPE: ICD-10-CM

## 2019-06-24 DIAGNOSIS — R25.1 TREMOR: Primary | ICD-10-CM

## 2019-06-24 LAB
% GRANULOCYTES: 73.6 %
ERYTHROCYTE [SEDIMENTATION RATE] IN BLOOD: 9 MM/HR (ref 0–20)
HCT VFR BLD AUTO: 37 % (ref 35–47)
HEMOGLOBIN: 11.9 G/DL (ref 11.7–15.7)
LYMPHOCYTES NFR BLD AUTO: 18.6 %
MCH RBC QN AUTO: 27.9 PG (ref 26–33)
MCHC RBC AUTO-ENTMCNC: 32.2 G/DL (ref 31–36)
MCV RBC AUTO: 86.8 FL (ref 78–100)
MONOCYTES NFR BLD AUTO: 7.8 %
PLATELET COUNT - QUEST: 300 10^9/L (ref 150–375)
RBC # BLD AUTO: 4.26 10*12/L (ref 3.8–5.2)
WBC # BLD AUTO: 9.3 10*9/L (ref 4–11)

## 2019-06-24 PROCEDURE — 86618 LYME DISEASE ANTIBODY: CPT | Mod: 90 | Performed by: FAMILY MEDICINE

## 2019-06-24 PROCEDURE — 85651 RBC SED RATE NONAUTOMATED: CPT | Performed by: FAMILY MEDICINE

## 2019-06-24 PROCEDURE — 36415 COLL VENOUS BLD VENIPUNCTURE: CPT | Performed by: FAMILY MEDICINE

## 2019-06-24 PROCEDURE — 99213 OFFICE O/P EST LOW 20 MIN: CPT | Performed by: FAMILY MEDICINE

## 2019-06-24 PROCEDURE — 85025 COMPLETE CBC W/AUTO DIFF WBC: CPT | Performed by: FAMILY MEDICINE

## 2019-06-24 NOTE — PROGRESS NOTES
SUBJECTIVE:  Muriel Gibson, a 43 year old female scheduled an appointment to discuss the following issues:     Tremor  Leukocytosis, unspecified type  Vitiligo  Pt here at request of chiropracter-has been having intermittent tremor or tremulousness for a month-affects arms, legs-sometimes makes it difficult to walk.  She was referred to neurology and has an appointment with Dr Alba in July.  She also has a cervical MRI set up soon.  Pt relates she has known carpal tunnel syndrome that causes some numbness in hands but chiropracter worried about neck.     Pt denies pain or swelling in joints.    Pt also had elevated WBC at her GYN yearly visit-not sure how high but was told to recheck this    Pt also notes she has developed vitiligo affecting hands-seems to be spreading.    Medical, social, surgical, and family histories reviewed.    Patient Active Problem List   Diagnosis     DKA (diabetic ketoacidoses) (H)     CARDIOVASCULAR SCREENING; LDL GOAL LESS THAN 100     Type 1 diabetes, HbA1c goal < 7% (H)-Dr Colvin     [facility]     Panic disorder     Basal cell carcinoma of back     Palpitations     ACP (advance care planning)     Past Medical History:   Diagnosis Date     Anemia      Diabetes mellitus (H)     Type I since age 25 yrs. old     Palpitations     PACs, PVCs     Family History   Problem Relation Age of Onset     Other Cancer Father      Hypertension Mother      Hyperlipidemia Mother      Thyroid Disease Mother      Other Cancer Maternal Grandfather      Colon Cancer No family hx of      Social History     Socioeconomic History     Marital status:      Spouse name: Not on file     Number of children: 1     Years of education: Not on file     Highest education level: Not on file   Occupational History     Not on file   Social Needs     Financial resource strain: Not on file     Food insecurity:     Worry: Not on file     Inability: Not on file     Transportation needs:     Medical: Not on file      Non-medical: Not on file   Tobacco Use     Smoking status: Never Smoker     Smokeless tobacco: Never Used   Substance and Sexual Activity     Alcohol use: No     Comment: very rarely     Drug use: No     Sexual activity: Yes   Lifestyle     Physical activity:     Days per week: Not on file     Minutes per session: Not on file     Stress: Not on file   Relationships     Social connections:     Talks on phone: Not on file     Gets together: Not on file     Attends Zoroastrianism service: Not on file     Active member of club or organization: Not on file     Attends meetings of clubs or organizations: Not on file     Relationship status: Not on file     Intimate partner violence:     Fear of current or ex partner: Not on file     Emotionally abused: Not on file     Physically abused: Not on file     Forced sexual activity: Not on file   Other Topics Concern     Parent/sibling w/ CABG, MI or angioplasty before 65F 55M? Not Asked      Service Not Asked     Blood Transfusions Not Asked     Caffeine Concern Not Asked     Occupational Exposure Not Asked     Hobby Hazards Not Asked     Sleep Concern Not Asked     Stress Concern Not Asked     Weight Concern Not Asked     Special Diet Yes     Comment: low carb due to diabetic     Back Care Not Asked     Exercise Yes     Comment: walking everyday     Bike Helmet Not Asked     Seat Belt Not Asked     Self-Exams Not Asked   Social History Narrative     Not on file     Past Surgical History:   Procedure Laterality Date     APPENDECTOMY      age 14 yrs.     COLONOSCOPY  9/12/2016    Dr. Blanton [facility]     COLONOSCOPY N/A 9/12/2016    Procedure: COLONOSCOPY;  Surgeon: Tanya Blanton MD;  Location: [facility]     HERNIA REPAIR  2006       Current Outpatient Medications on File Prior to Visit:  ACCU-CHEK SKY PLUS test strip USE TO TEST 6 TIME PER DAY   albuterol (PROAIR HFA/PROVENTIL HFA/VENTOLIN HFA) 108 (90 Base) MCG/ACT inhaler Inhale 2 puffs into the lungs every 6 hours as  "needed for shortness of breath / dyspnea or wheezing   B-D INSULIN SYRINGE 31G X 5/16\" 0.5 ML USE TO TEST UP TO 5 TIMES D   GLUCAGON EMERGENCY 1 MG kit INJECT 1ML IM ONCE. MAY REPEAT DOSE IN 15 MINUTES IF INADEQUATE RESPONE.   insulin lispro (HUMALOG) 100 UNIT/ML injection Inject  Subcutaneous 3 times daily (with meals). 3-5 units after breakfast and dinner, and 1-2 units after lunch per sliding scale BG    insulin NPH (NOVOLIN N PENFILL) 100 UNIT/ML injection Inject 19 Units Subcutaneous 2 times daily. In the morning and at bedtime.    MICROLET LANCETS MISC TESTING 7 TIMES DAILY   sertraline (ZOLOFT) 50 MG tablet      No current facility-administered medications on file prior to visit.      Allergies: Azithromycin and Cefuroxime    Immunization History   Administered Date(s) Administered     MMR 02/27/2002     TDAP Vaccine (Adacel) 01/01/2009     TDAP Vaccine (Boostrix) 11/15/2012        ROS:  CONSTITUTIONAL: NEGATIVE for fever, chills  EYES: NEGATIVE for vision changes   RESP: NEGATIVE for significant cough or SOB  CV: NEGATIVE for chest pain, palpitations   GI: NEGATIVE for nausea, abdominal pain, heartburn, or change in bowel habits  : NEGATIVE for frequency, dysuria, or hematuria  MUSCULOSKELETAL: NEGATIVE for significant arthralgias or myalgia  ENDOCRINE: NEGATIVE for temperature intolerance, skin/hair changes  PSYCHIATRIC: NEGATIVE for changes in mood or affect    OBJECTIVE:  /74 (BP Location: Right arm, Patient Position: Sitting, Cuff Size: Adult Large)   Pulse 75   Temp 98.4  F (36.9  C) (Oral)   Wt 68.5 kg (151 lb)   SpO2 98%   BMI 25.92 kg/m    EXAM:  GENERAL APPEARANCE: healthy, alert and no distress  EYES: EOMI,  PERRL  HENT: ear canals and TM's normal and nose and mouth without ulcers or lesions  RESP: lungs clear to auscultation - no rales, rhonchi or wheezes  CV: regular rates and rhythm, normal S1 S2, no S3 or S4 and no murmur, click or rub -  ABDOMEN:  soft, nontender, no HSM or masses " and bowel sounds normal  MS: extremities normal- no gross deformities noted, no evidence of inflammation in joints, FROM in all extremities.  SKIN: no suspicious lesions or rashes  NEURO: Normal strength and tone, sensory exam grossly normal, mentation intact and speech normal  PSYCH: mentation appears normal and affect normal/bright    ASSESSMENT/PLAN:  (R25.1) Tremor  (primary encounter diagnosis)  Comment: pt with intermittent symptoms -sounds neurologic  Plan: Lymes Antibodies Total ([facility]), ESR, WESTERGREN        ([facility]), VENOUS COLLECTION        Recommend neurology eval as planned-check Lyme and ESR-no symptoms to suggest inflammatory rheum disorder    (D72.829) Leukocytosis, unspecified type  Comment: normal today  Plan: CL AFF HEMOGRAM/PLATE/DIFF ([facility])            (L80) Vitiligo  Comment: unclear if this may be autoimmune issue related to above symptoms   Plan: recommend neurology, possibly rheum and derm if no etiology found

## 2019-06-24 NOTE — NURSING NOTE
Muriel is here today to disucss trouble walking straight.  Pre-visit Screening:  Immunizations:  up to date  Colonoscopy:  NA  Mammogram: is up to date  Asthma Action Test/Plan:  ROSARIO  PHQ9:  PHQ 2 done today  GAD7:  NA  Questioned patient about current smoking habits Pt. has never smoked.  Ok to leave detailed message on voice mail for today's visit only Yes, phone # [phone]

## 2019-06-25 LAB — LYME SCREEN IGG AND IGM: <0.9 INDEX

## 2019-09-18 ENCOUNTER — TRANSFERRED RECORDS (OUTPATIENT)
Dept: FAMILY MEDICINE | Facility: CLINIC | Age: 44
End: 2019-09-18

## 2019-12-30 ENCOUNTER — OFFICE VISIT (OUTPATIENT)
Dept: FAMILY MEDICINE | Facility: CLINIC | Age: 44
End: 2019-12-30

## 2019-12-30 VITALS
HEART RATE: 73 BPM | TEMPERATURE: 97.3 F | BODY MASS INDEX: 26.29 KG/M2 | SYSTOLIC BLOOD PRESSURE: 118 MMHG | DIASTOLIC BLOOD PRESSURE: 72 MMHG | HEIGHT: 64 IN | WEIGHT: 154 LBS | OXYGEN SATURATION: 99 %

## 2019-12-30 DIAGNOSIS — B00.9 HERPES SIMPLEX VIRUS INFECTION: Primary | ICD-10-CM

## 2019-12-30 PROCEDURE — 99213 OFFICE O/P EST LOW 20 MIN: CPT | Performed by: FAMILY MEDICINE

## 2019-12-30 RX ORDER — VALACYCLOVIR HYDROCHLORIDE 1 G/1
1000 TABLET, FILM COATED ORAL 2 TIMES DAILY
Qty: 20 TABLET | Refills: 0 | Status: SHIPPED | OUTPATIENT
Start: 2019-12-30 | End: 2021-08-11

## 2019-12-30 RX ORDER — ACYCLOVIR 50 MG/G
OINTMENT TOPICAL
COMMUNITY
Start: 2019-12-20 | End: 2021-08-11

## 2019-12-30 ASSESSMENT — MIFFLIN-ST. JEOR: SCORE: 1333.54

## 2019-12-30 NOTE — PROGRESS NOTES
SUBJECTIVE: Muriel Gibson is a 44 year old female who presents for rash on right thenar web space for 10 days-initially noted a blister that drained some yellow fluid, then develop 2 smaller blisters which apparently drained and then coalesced.  No crusting noted.  The area is sore.  She did feel chilled and achy when this started.    She went to [facility] and was not given diagnosis but was prescribed antiviral cream-not sure better as still present but not getting worse    Patient Active Problem List   Diagnosis     DKA (diabetic ketoacidoses) (H)     CARDIOVASCULAR SCREENING; LDL GOAL LESS THAN 100     Type 1 diabetes, HbA1c goal < 7% (H)-Dr Colvin     [facility]     Panic disorder     Basal cell carcinoma of back     Palpitations     ACP (advance care planning)     Past Medical History:   Diagnosis Date     Anemia      Diabetes mellitus (H)     Type I since age 25 yrs. old     Palpitations     PACs, PVCs     Family History   Problem Relation Age of Onset     Other Cancer Father      Hypertension Mother      Hyperlipidemia Mother      Thyroid Disease Mother      Other Cancer Maternal Grandfather      Colon Cancer No family hx of      Social History     Socioeconomic History     Marital status:      Spouse name: Not on file     Number of children: 1     Years of education: Not on file     Highest education level: Not on file   Occupational History     Not on file   Social Needs     Financial resource strain: Not on file     Food insecurity:     Worry: Not on file     Inability: Not on file     Transportation needs:     Medical: Not on file     Non-medical: Not on file   Tobacco Use     Smoking status: Never Smoker     Smokeless tobacco: Never Used   Substance and Sexual Activity     Alcohol use: No     Comment: very rarely     Drug use: No     Sexual activity: Yes   Lifestyle     Physical activity:     Days per week: Not on file     Minutes per session: Not on file     Stress: Not on file  "  Relationships     Social connections:     Talks on phone: Not on file     Gets together: Not on file     Attends Orthodoxy service: Not on file     Active member of club or organization: Not on file     Attends meetings of clubs or organizations: Not on file     Relationship status: Not on file     Intimate partner violence:     Fear of current or ex partner: Not on file     Emotionally abused: Not on file     Physically abused: Not on file     Forced sexual activity: Not on file   Other Topics Concern     Parent/sibling w/ CABG, MI or angioplasty before 65F 55M? Not Asked      Service Not Asked     Blood Transfusions Not Asked     Caffeine Concern Not Asked     Occupational Exposure Not Asked     Hobby Hazards Not Asked     Sleep Concern Not Asked     Stress Concern Not Asked     Weight Concern Not Asked     Special Diet Yes     Comment: low carb due to diabetic     Back Care Not Asked     Exercise Yes     Comment: walking everyday     Bike Helmet Not Asked     Seat Belt Not Asked     Self-Exams Not Asked   Social History Narrative     Not on file     Past Surgical History:   Procedure Laterality Date     APPENDECTOMY      age 14 yrs.     COLONOSCOPY  9/12/2016    Dr. Blanton [facility]     COLONOSCOPY N/A 9/12/2016    Procedure: COLONOSCOPY;  Surgeon: Tanya Blanton MD;  Location:  GI     HERNIA REPAIR  2006     ACCU-CHEK SKY PLUS test strip, USE TO TEST 6 TIME PER DAY  acyclovir (ZOVIRAX) 5 % external ointment, CINDY EXT AA 6 XD FOR 7 DAYS  albuterol (PROAIR HFA/PROVENTIL HFA/VENTOLIN HFA) 108 (90 Base) MCG/ACT inhaler, Inhale 2 puffs into the lungs every 6 hours as needed for shortness of breath / dyspnea or wheezing  B-D INSULIN SYRINGE 31G X 5/16\" 0.5 ML, USE TO TEST UP TO 5 TIMES D  GLUCAGON EMERGENCY 1 MG kit, INJECT 1ML IM ONCE. MAY REPEAT DOSE IN 15 MINUTES IF INADEQUATE RESPONE.  insulin lispro (HUMALOG) 100 UNIT/ML injection, Inject  Subcutaneous 3 times daily (with meals). 3-5 units after " "breakfast and dinner, and 1-2 units after lunch per sliding scale BG   insulin NPH (NOVOLIN N PENFILL) 100 UNIT/ML injection, Inject 19 Units Subcutaneous 2 times daily. In the morning and at bedtime.   MICROLET LANCETS MISC, TESTING 7 TIMES DAILY  sertraline (ZOLOFT) 50 MG tablet,     No current facility-administered medications on file prior to visit.        Allergies: Azithromycin and Cefuroxime    Immunization History   Administered Date(s) Administered     MMR 02/27/2002     TDAP Vaccine (Adacel) 01/01/2009     TDAP Vaccine (Boostrix) 11/15/2012        OBJECTIVE:   /72 (BP Location: Right arm, Patient Position: Sitting, Cuff Size: Adult Large)   Pulse 73   Temp 97.3  F (36.3  C) (Oral)   Ht 1.626 m (5' 4\")   Wt 69.9 kg (154 lb)   SpO2 99%   BMI 26.43 kg/m     Skin: right thenar webspace with 2 dark erythematous macular spots grouped nearby, one larger, no vesicles seen, no ulcers, no other lesions     ASSESSMENT: /PLAN:   (B00.9) Herpes simplex virus infection  (primary encounter diagnosis)  Comment: suspect herpes simplex infection-no signs zoster in this very small localized area-no signs impetigo  Plan: valACYclovir (VALTREX) 1000 mg tablet        We agree to try oral antiviral, see derm or call me if not better     "

## 2019-12-30 NOTE — NURSING NOTE
Muriel is here for right thrumb infection for 10 days.        Pre-visit Screening:  Immunizations:  up to date  Colonoscopy:  NA  Mammogram: up to date  Asthma Action Test/Plan:  NA  PHQ9:  NA to today's visit  GAD7:  NA to today's visit  Questioned patient about current smoking habits Pt. has never smoked.  Ok to leave detailed message on voice mail for today's visit only Yes, phone # cell

## 2020-01-25 DIAGNOSIS — R06.2 WHEEZING: ICD-10-CM

## 2020-01-25 RX ORDER — ALBUTEROL SULFATE 90 UG/1
AEROSOL, METERED RESPIRATORY (INHALATION)
Qty: 54 G | Refills: 1 | Status: SHIPPED | OUTPATIENT
Start: 2020-01-25 | End: 2021-08-11

## 2020-01-25 NOTE — TELEPHONE ENCOUNTER
Muriel Gibson is requesting a refill of:    Pending Prescriptions:                       Disp   Refills    albuterol (PROAIR HFA/PROVENTIL HFA/LAYNE*54 g                Sig: SHAKE WELL AND INHALE 2 PUFFS INTO THE LUNGS           EVERY 6 HOURS AS NEEDED FOR SHORTNESS OF BREATH           OR DIFFICULT BREATHING OR WHEEZING    ACT Total Scores 12/15/2017 11/16/2018   ACT TOTAL SCORE (Goal Greater than or Equal to 20) 11 18   In the past 12 months, how many times did you visit the emergency room for your asthma without being admitted to the hospital? 0 0   In the past 12 months, how many times were you hospitalized overnight because of your asthma? 0 0

## 2020-03-06 ENCOUNTER — HOSPITAL ENCOUNTER (OUTPATIENT)
Dept: MAMMOGRAPHY | Facility: CLINIC | Age: 45
Discharge: HOME | End: 2020-03-06
Attending: OBSTETRICS & GYNECOLOGY | Admitting: OBSTETRICS & GYNECOLOGY
Payer: COMMERCIAL

## 2020-03-06 DIAGNOSIS — Z12.31 VISIT FOR SCREENING MAMMOGRAM: ICD-10-CM

## 2020-03-06 PROCEDURE — 77067 SCR MAMMO BI INCL CAD: CPT

## 2020-03-23 ENCOUNTER — TRANSFERRED RECORDS (OUTPATIENT)
Dept: FAMILY MEDICINE | Facility: CLINIC | Age: 45
End: 2020-03-23

## 2020-07-24 ENCOUNTER — ALLIED HEALTH/NURSE VISIT (OUTPATIENT)
Dept: FAMILY MEDICINE | Facility: CLINIC | Age: 45
End: 2020-07-24

## 2020-07-24 DIAGNOSIS — Z23 NEED FOR VACCINATION: Primary | ICD-10-CM

## 2020-07-24 PROCEDURE — 90714 TD VACC NO PRESV 7 YRS+ IM: CPT | Performed by: FAMILY MEDICINE

## 2020-07-24 PROCEDURE — 90471 IMMUNIZATION ADMIN: CPT | Performed by: FAMILY MEDICINE

## 2021-03-25 ENCOUNTER — TRANSFERRED RECORDS (OUTPATIENT)
Dept: FAMILY MEDICINE | Facility: CLINIC | Age: 46
End: 2021-03-25

## 2021-03-25 LAB
GLUCOSE SERPL-MCNC: 183 MG/DL (ref 65–99)
HBA1C MFR BLD: 5.6 % (ref 4–6)
T4 FREE SERPL-MCNC: 0.92 NG/DL (ref 0.71–1.85)
TSH SERPL-ACNC: 2.67 MCU/ML (ref 0.3–5)

## 2021-04-01 ENCOUNTER — TRANSFERRED RECORDS (OUTPATIENT)
Dept: FAMILY MEDICINE | Facility: CLINIC | Age: 46
End: 2021-04-01

## 2021-04-08 ENCOUNTER — HOSPITAL ENCOUNTER (OUTPATIENT)
Dept: MAMMOGRAPHY | Facility: CLINIC | Age: 46
Discharge: HOME | End: 2021-04-08
Attending: OBSTETRICS & GYNECOLOGY | Admitting: OBSTETRICS & GYNECOLOGY
Payer: COMMERCIAL

## 2021-04-08 DIAGNOSIS — Z12.31 VISIT FOR SCREENING MAMMOGRAM: ICD-10-CM

## 2021-04-08 PROCEDURE — 77063 BREAST TOMOSYNTHESIS BI: CPT

## 2021-07-09 ENCOUNTER — TRANSFERRED RECORDS (OUTPATIENT)
Dept: FAMILY MEDICINE | Facility: CLINIC | Age: 46
End: 2021-07-09

## 2021-07-26 ENCOUNTER — TRANSFERRED RECORDS (OUTPATIENT)
Dept: FAMILY MEDICINE | Facility: CLINIC | Age: 46
End: 2021-07-26

## 2021-08-10 ENCOUNTER — HOSPITAL ENCOUNTER (EMERGENCY)
Facility: CLINIC | Age: 46
Discharge: HOME | End: 2021-08-11
Attending: EMERGENCY MEDICINE | Admitting: EMERGENCY MEDICINE
Payer: COMMERCIAL

## 2021-08-10 DIAGNOSIS — T78.40XA ALLERGIC REACTION, INITIAL ENCOUNTER: ICD-10-CM

## 2021-08-10 PROCEDURE — 99282 EMERGENCY DEPT VISIT SF MDM: CPT

## 2021-08-10 ASSESSMENT — MIFFLIN-ST. JEOR: SCORE: 1344.42

## 2021-08-11 ENCOUNTER — CARE COORDINATION (OUTPATIENT)
Dept: FAMILY MEDICINE | Facility: CLINIC | Age: 46
End: 2021-08-11

## 2021-08-11 VITALS
DIASTOLIC BLOOD PRESSURE: 81 MMHG | OXYGEN SATURATION: 99 % | WEIGHT: 154 LBS | TEMPERATURE: 98.1 F | BODY MASS INDEX: 25.66 KG/M2 | RESPIRATION RATE: 18 BRPM | HEART RATE: 65 BPM | HEIGHT: 65 IN | SYSTOLIC BLOOD PRESSURE: 121 MMHG

## 2021-08-11 RX ORDER — LISINOPRIL 10 MG/1
10 TABLET ORAL DAILY
COMMUNITY
Start: 2021-06-18 | End: 2024-02-07

## 2021-08-11 RX ORDER — EPINEPHRINE 0.3 MG/.3ML
0.3 INJECTION SUBCUTANEOUS
Qty: 1 EACH | Refills: 0 | Status: SHIPPED | OUTPATIENT
Start: 2021-08-11

## 2021-08-11 ASSESSMENT — ENCOUNTER SYMPTOMS
SHORTNESS OF BREATH: 0
NUMBNESS: 1

## 2021-08-11 NOTE — ED TRIAGE NOTES
Pt arrives from home via EMS for allergic reaction to what she believes is from a new type of toothpaste just PTA. Immediately following brushing she felt her lips and back of tongue start to swell, as well as L side of throat. Pt took 50mg Benadryl at home and that has reduced the swelling significantly. Pt has never had anaphylactic type reaction in the past. VSS, A&Ox4.

## 2021-08-11 NOTE — ED PROVIDER NOTES
"  History     Chief Complaint:  Allergic Reaction    HPI   Muriel Gibson is a 45 year old female with history of type I diabetes, anemia and panic disorder who presents with a suspected allergic reaction. EMS reports that at around 2300 tonight the patient had just finished brushing her teeth for about 2 minutes when she began having swelling to her lips, tongue and back of throat. She also mentions having some left facial tingling. The patient took 50 mg of Benadryl and called EMS. In the emergency department, the patient state that she has used this brand and type of toothpaste in the past with no allergic symptoms. The active ingredient in her toothpaste is noted to be 0.454% stannous fluoride. She is currently feeling better with less swelling in her lips and throat. She does continue to have left sided facial tingling, but denies any shortness of breath or rash.     Review of Systems   HENT:        (+) throat, lip and tongue swelling   Respiratory: Negative for shortness of breath.    Skin: Negative for rash.   Neurological: Positive for numbness (L face).   All other systems reviewed and are negative.    Allergies:  Azithromycin  Cefuroxime    Medications:  Albuterol  Humalog  Novolin  Zoloft  Valtrex    Past Medical History:    Anemia  Type I diabetes  Basal cell carcinoma  Panic disorder    Past Surgical History:    Appendectomy  Colonoscopy  Hernia repair     Family History:    Father: cancer  Mother: hypertension, hyperlipidemia, thyroid disease    Social History:  The patient presents alone.     Physical Exam     Patient Vitals for the past 24 hrs:   BP Temp Temp src Pulse Resp SpO2 Height Weight   08/10/21 2356 152/84 98.1  F (36.7  C) Oral 78 18 100 % 1.651 m (5' 5\") 69.9 kg (154 lb)     Physical Exam  Nursing note and vitals reviewed.  Constitutional: Cooperative.   HENT:   Mouth/Throat: Mucous membranes are normal.   Oropharynx normal  Tolerating secretions, no edema.   Eyes: Pupils are equal, round, " and reactive to light.   Cardiovascular: Normal rate, regular rhythm and normal heart sounds.  No murmur.  Pulmonary/Chest: Effort normal and breath sounds normal. No respiratory distress. No wheezes.  Neurological: Alert. Cranial nerves 2-12 intact.   Skin: Skin is warm and dry. No rash or urticaria noted.   Psychiatric: Normal mood and affect.      Emergency Department Course     Reviewed:  I reviewed nursing notes, vitals, past medical history and care everywhere    Assessments:  0000 I obtained history and examined the patient as noted above.   0112 I rechecked the patient and she is feeling improved.    Disposition:  The patient was discharged to home.     Impression & Plan     Medical Decision Making:  Muriel Gibson is a 45 year old female who presents with complaint of allergic reaction.  The patient has hives but no signs of airway compromise or anaphylaxis. I suspect localized reaction.  The patient took Benadryl at home and did not require any further interventions in the emergency department. She observed for 1.5 hours.  At this point there are no signs of worsening clinical status and I believe the patient is safe for discharge home.  I discharged with prescriptions for epinephrine should she develop signs of anaphylaxis.  Recommended follow-up with PCP in 1-2 days for persistent symptoms and given precautions to return to ED should symptoms worsen/change.    Diagnosis:    ICD-10-CM    1. Localized Allergic reaction to dental hygiene product, initial encounter  T78.40XA        Discharge Medications:  New Prescriptions    EPINEPHRINE (ANY BX GENERIC EQUIV) 0.3 MG/0.3ML INJECTION 2-PACK    Inject 0.3 mLs (0.3 mg) into the muscle once as needed for anaphylaxis       Scribe Disclosure:  I, Zane Donato, am serving as a scribe at 11:54 PM on 8/10/2021 to document services personally performed by Aditya Quintanilla MD based on my observations and the provider's statements to me.      Aditya Quintanilla MD  08/11/21 9439

## 2021-08-11 NOTE — PROGRESS NOTES
Care Coordination Initial Assessment    The patient was seen at [facility] ER on 8/10/21 for allergic reaction to dental hygiene products. She was discharged with instructions to follow up with PCP within 1-2 days to further evaluate.     PCP: Fredi Gonzalez    Referral Source:  ED/IP List    Utilization:   ED visits in last year: 1  Last PCP Appt.: 12/30/2019 with Dr. Gonzalez    Health Maintenance Reviewed: Yes    Current Medical Health Concerns:   Cardiac: Palpitations  Diabetes: Type 1 diabetes   Basal cell carcinoma of back, panic disorder    Patient/Caregiver Understanding: N/A-Patient did not answer the phone.    Medication Management:   Unable to review with patient over the phone as she did not answer.    Functional Status:   N/A-Unable to assess over the phone as the patient did not answer the phone.    Current Behavioral Health Concerns:   N/A-Unable to review behavior with patient over the phone-she does have panic disorder on her problem list.     Patient/Caregiver Understanding:  N/A    Psychosocial:  Unable to review or assess-pt did not answer the phone.    Gaps:    Health Maintenance: Patient has many things to review on her HM however many of them are for diabetes and patient may be seeing Endo and being followed by them for this.     Resources Given:    N/A    Plan:   I attempted to reach the patient by phone but there was no answer. I was not able to leave a message on the  as it was not set up yet. I did send the patient a [employer] message inquiring if she is now going somewhere else for her primary care as she has not been seen here since December of 2019. I did inform her to call and schedule with Dr. Gonzalez if she is still going here for her primary care.

## 2021-08-11 NOTE — PROGRESS NOTES
Pt called back and is wondering what could have caused her allergic reaction.    [phone]    Called her back. Lost contact on her phone

## 2021-08-17 ENCOUNTER — OFFICE VISIT (OUTPATIENT)
Dept: VASCULAR SURGERY | Facility: CLINIC | Age: 46
End: 2021-08-17

## 2021-08-17 DIAGNOSIS — I78.1 SPIDER VEINS: Primary | ICD-10-CM

## 2021-08-17 DIAGNOSIS — I78.1 SPIDER TELANGIECTASIS OF SKIN: ICD-10-CM

## 2021-08-17 PROCEDURE — S9999 SALES TAX: HCPCS | Performed by: SURGERY

## 2021-08-17 PROCEDURE — A6533 GC STOCKING THIGHLNGTH 18-30: HCPCS | Performed by: SURGERY

## 2021-08-17 PROCEDURE — 36468 NJX SCLRSNT SPIDER VEINS: CPT | Performed by: SURGERY

## 2021-08-17 NOTE — PROGRESS NOTES
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  The patient voiced understanding and wished to proceed.  Informed consent was obtained.    I donned the Syris headlight and injected numerous telangiectasias over the medial calf and medial thighs primarily.  There were also veins on the lateral thighs and proximal lateral legs as well, less significant.  I had the patient turned prone and injected a few telangiectasias on the posterior thighs as well.  We used 4 syringes of 0.5% polidocanol foam (12 mL).    I had the patient perform ankle pumps on the table.  We cleaned her legs, had her don thigh-high compression, had her walk for 10 minutes prior to going home and we will have her return in 6 weeks in follow-up.  She will likely need another session of sclerotherapy.    She tolerated procedure well without evidence of lower tract or other complications.      Sclerotherapy    Date/Time: 8/17/2021 5:10 PM  Performed by: Kit Mccloud MD  Authorized by: Kit Mccloud MD     Time out: Immediately prior to the procedure a time out was called    Type:  Cosmetic  Session:  Full  Procedure side:  Bilateral  Solution/Amount:  .5 POLIDOCANOL  Syringes::  4  Patient tolerance:  Patient tolerated the procedure well with no immediate complications  Wrap/Hose:  Lonnie Mccloud MD    Dictated using Dragon voice recognition software which may result in transcription errors

## 2021-08-17 NOTE — LETTER
8/17/2021         RE: Muriel Gibson  [address]  [place] [address]        Dear Colleague,    Thank you for referring your patient, Muriel Gibson, to the [facility] [place]. Please see a copy of my visit note below.        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  The patient voiced understanding and wished to proceed.  Informed consent was obtained.    I donned the Syris headlight and injected numerous telangiectasias over the medial calf and medial thighs primarily.  There were also veins on the lateral thighs and proximal lateral legs as well, less significant.  I had the patient turned prone and injected a few telangiectasias on the posterior thighs as well.  We used 4 syringes of 0.5% polidocanol foam (12 mL).    I had the patient perform ankle pumps on the table.  We cleaned her legs, had her don thigh-high compression, had her walk for 10 minutes prior to going home and we will have her return in 6 weeks in follow-up.  She will likely need another session of sclerotherapy.    She tolerated procedure well without evidence of lower tract or other complications.      Sclerotherapy    Date/Time: 8/17/2021 5:10 PM  Performed by: Kit Mccloud MD  Authorized by: Kit Mccloud MD     Time out: Immediately prior to the procedure a time out was called    Type:  Cosmetic  Session:  Full  Procedure side:  Bilateral  Solution/Amount:  .5 POLIDOCANOL  Syringes::  4  Patient tolerance:  Patient tolerated the procedure well with no immediate complications  Wrap/Hose:  Lonnie Mccloud MD    Dictated using Dragon voice recognition  software which may result in transcription errors      Again, thank you for allowing me to participate in the care of your patient.        Sincerely,        Kit Mccloud MD

## 2021-09-28 ENCOUNTER — TRANSFERRED RECORDS (OUTPATIENT)
Dept: FAMILY MEDICINE | Facility: CLINIC | Age: 46
End: 2021-09-28

## 2021-10-13 ENCOUNTER — OFFICE VISIT (OUTPATIENT)
Dept: VASCULAR SURGERY | Facility: CLINIC | Age: 46
End: 2021-10-13
Payer: COMMERCIAL

## 2021-10-13 DIAGNOSIS — I78.1 SPIDER TELANGIECTASIS OF SKIN: Primary | ICD-10-CM

## 2021-10-13 PROCEDURE — 99207 PR VEINSOLUTIONS NO CHARGE VISIT: CPT | Performed by: SURGERY

## 2021-10-13 NOTE — PROGRESS NOTES
[facility] [place] office note  Muriel Gibson returns in 6-week follow-up of cosmetic sclerotherapy of bilateral lower extremity reticular veins and telangiectasias.    Exam  Rather prominent reticular veins and telangiectasias remain over the anteromedial legs bilaterally.  In reviewing her pretreatment photos, there has been little improvement.  This was voiced by the patient as well.    She has had endovenous ablation of her right great saphenous vein in the past.    Impression  Poor response to cosmetic sclerotherapy with numerous reticular veins and scattered telangiectasias over the proximal anteromedial calves bilaterally.  She may have some feeding subcutaneous tributaries which are limiting the response to visual sclerotherapy.    I would prefer to have her return when I can use ultrasound to image the proximal anteromedial legs to see if there are feeding subcutaneous varicosities which could be treated to give her a better cosmetic result.  Would also check to be sure that the right great saphenous vein remains closed.    I explained to the patient that the risks of this proposed treatment would be little different than the cosmetic sclerotherapy we have performed.  She voiced understanding and will return in the near future.    REJI Mccloud MD    Dictated using Dragon voice recognition software which may result in transcription errors

## 2021-10-13 NOTE — LETTER
10/13/2021         RE: Muriel Gibson  [address] [place] [address]        Dear Colleague,    Thank you for referring your patient, Muriel Gibson, to the [facility]IN CL[facility] [place]. Please see a copy of my visit note below.    [facility] office note  Muriel Gibson returns in 6-week follow-up of cosmetic sclerotherapy of bilateral lower extremity reticular veins and telangiectasias.    Exam  Rather prominent reticular veins and telangiectasias remain over the anteromedial legs bilaterally.  In reviewing her pretreatment photos, there has been little improvement.  This was voiced by the patient as well.    She has had endovenous ablation of her right great saphenous vein in the past.    Impression  Poor response to cosmetic sclerotherapy with numerous reticular veins and scattered telangiectasias over the proximal anteromedial calves bilaterally.  She may have some feeding subcutaneous tributaries which are limiting the response to visual sclerotherapy.    I would prefer to have her return when I can use ultrasound to image the proximal anteromedial legs to see if there are feeding subcutaneous varicosities which could be treated to give her a better cosmetic result.  Would also check to be sure that the right great saphenous vein remains closed.    I explained to the patient that the risks of this proposed treatment would be little different than the cosmetic sclerotherapy we have performed.  She voiced understanding and will return in the near future.    REJI Mccloud MD    Dictated using Dragon voice recognition software which may result in transcription errors        Again, thank you for allowing me to participate in the care of your patient.        Sincerely,        Kit Mccloud MD

## 2021-11-16 LAB
CHOLESTEROL: 157 MG/DL (ref 100–199)
GLUCOSE (EXTERNAL): 195 MG/DL (ref 70–99)
HBA1C MFR BLD: 5.8 % (ref 4–6)
HDL (RMG): 54 MG/DL (ref 40–?)
LDL CALCULATED (RMG): 87 MG/DL (ref 0–130)
TRIGLYCERIDES (RMG): 87 MG/DL (ref 0–149)

## 2021-11-18 ENCOUNTER — TRANSFERRED RECORDS (OUTPATIENT)
Dept: FAMILY MEDICINE | Facility: CLINIC | Age: 46
End: 2021-11-18

## 2022-03-29 ENCOUNTER — OFFICE VISIT (OUTPATIENT)
Dept: VASCULAR SURGERY | Facility: CLINIC | Age: 47
End: 2022-03-29

## 2022-03-29 DIAGNOSIS — I78.1 SPIDER TELANGIECTASIS OF SKIN: Primary | ICD-10-CM

## 2022-03-29 PROCEDURE — S9999 SALES TAX: HCPCS | Performed by: SURGERY

## 2022-03-29 PROCEDURE — 36468 NJX SCLRSNT SPIDER VEINS: CPT | Performed by: SURGERY

## 2022-03-29 NOTE — PROGRESS NOTES
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  Ultrasound-guided sclerotherapy right medial calf perforating vein and subcutaneous veins  2.  Ultrasound-guided sclerotherapy left medial calf subcutaneous vein  3.  Bilateral calf direct vision spider telangiectasia cosmetic sclerotherapy     Procedure description  Details of procedure including risks of allergic reaction, deep vein thrombosis, ulceration, superficial thrombophlebitis and hyperpigmentation were discussed.  The patient voiced understanding and wished to proceed.  Informed consent was obtained.    The patient had had persistent reticular veins over her anteromedial legs that had not responded well to previous sclerotherapy.  I therefore felt that ultrasound imaging to look for veins  feeding these areas was indicated.    I imaged the left medial calf and noted a tributary coursing from the great saphenous vein in the proximal leg, just below the.  This branch coursed laterally to the area of telangiectasias.   I accessed this vein under ultrasound guidance and injected 2.5 mL of 1% polidocanol foam.    I then imaged the right medial leg and noted a perforating vein in the proximal medial calf that had to tributaries coursing from it, one coursing cephalad just inferior to the patella and the second going more distally toward the area of reticular veins.  I initially injected the vein ultrasound guidance at the confluence of the 2 branches, then I treated the more inferior branch which coursed toward the area of reticular veins using 1% polidocanol foam, 2 mL.    I then donned the Syris headlight and injected the visible telangiectasias over the legs bilaterally.  I did not inject above the knees and primarily anteriorly, medially and laterally.  There were a few reticular veins but most of the treated veins were telangiectasias.    Had  the patient perform ankle pumps.  We cleaned her legs, had her don thigh-high compression, then had her walk for 10 minutes.  She will return in approximate 6 weeks in follow-up to have the thigh telangiectasias treated as well as the lower leg telangiectasias.  She tolerated procedure well but evidence of allergic reaction of complications.    Sclerotherapy    Date/Time: 3/29/2022 4:23 PM  Performed by: Kit Mccloud MD  Authorized by: Kit Mccloud MD     Time out: Immediately prior to the procedure a time out was called    Type:  Cosmetic  Session:  Full  Procedure side:  Bilateral  Solution/Amount:  .5 POLIDOCANOL and 1% POLIDOCANOL  Syringes:  1 syringe 1% polidocanol; 2 syringes 0.5% polidocanol  Patient tolerance:  Patient tolerated the procedure well with no immediate complications  Wrap/Hose:  Lonnie Mccloud MD    Dictated using Dragon voice recognition software which may result in transcription errors

## 2022-03-29 NOTE — LETTER
3/29/2022         RE: Muriel Gibson  [address]  [place] [address]        Dear Colleague,    Thank you for referring your patient, Muriel Gibson, to the [facility] VEIN CL[facility] [place]. Please see a copy of my visit note below.        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  Ultrasound-guided sclerotherapy right medial calf perforating vein and subcutaneous veins  2.  Ultrasound-guided sclerotherapy left medial calf subcutaneous vein  3.  Bilateral calf direct vision spider telangiectasia cosmetic sclerotherapy     Procedure description  Details of procedure including risks of allergic reaction, deep vein thrombosis, ulceration, superficial thrombophlebitis and hyperpigmentation were discussed.  The patient voiced understanding and wished to proceed.  Informed consent was obtained.    The patient had had persistent reticular veins over her anteromedial legs that had not responded well to previous sclerotherapy.  I therefore felt that ultrasound imaging to look for veins of IV feeding these areas was indicated.    I imaged the left medial calf and noted a tributary coursing from the great saphenous vein in the proximal leg, just below the.  This branch coursed.me laterally to the area of telangiectasias.  I I accessed this vein under ultrasound guidance and injected 2.5 mL of 1% polidocanol foam.    I then imaged the right medial leg and noted a perforating vein in the proximal medial calf that had to tributaries coursing from it, one coursing cephalad just inferior to the patella and the second going more distally toward the area of reticular veins.  I initially injected the vein ultrasound guidance at the confluence of the 2 branches, then I treated the more inferior branch which coursed toward the area of reticular veins using 1% polidocanol foam, 2 mL.    I then donned the Syris  headlight and injected the visible telangiectasias over the legs bilaterally.  I did not inject above the knees and primarily anteriorly, medially and laterally.  There were a few reticular veins but most of the treated veins were telangiectasias.    Had the patient perform ankle pumps.  We cleaned her legs, had her don thigh-high compression, then had her walk for 10 minutes.  She will return in approximate 6 weeks in follow-up to have the thigh telangiectasias treated as well as the lower leg telangiectasias.  She tolerated procedure well but evidence of allergic reaction of complications.    Sclerotherapy    Date/Time: 3/29/2022 4:23 PM  Performed by: Kit Mccloud MD  Authorized by: Kit Mccloud MD     Time out: Immediately prior to the procedure a time out was called    Type:  Cosmetic  Session:  Full  Procedure side:  Bilateral  Solution/Amount:  .5 POLIDOCANOL and 1% POLIDOCANOL  Syringes:  1 syringe 1% polidocanol; 2 syringes 0.5% polidocanol  Patient tolerance:  Patient tolerated the procedure well with no immediate complications  Wrap/Hose:  Lonnie Mccloud MD    Dictated using Dragon voice recognition software which may result in transcription errors      Again, thank you for allowing me to participate in the care of your patient.        Sincerely,        Kit Mccloud MD

## 2022-05-19 ENCOUNTER — HOSPITAL ENCOUNTER (OUTPATIENT)
Dept: MAMMOGRAPHY | Facility: CLINIC | Age: 47
Discharge: HOME | End: 2022-05-19
Attending: OBSTETRICS & GYNECOLOGY | Admitting: OBSTETRICS & GYNECOLOGY
Payer: COMMERCIAL

## 2022-05-19 DIAGNOSIS — Z12.31 VISIT FOR SCREENING MAMMOGRAM: ICD-10-CM

## 2022-05-19 PROCEDURE — 77067 SCR MAMMO BI INCL CAD: CPT

## 2022-05-24 ENCOUNTER — OFFICE VISIT (OUTPATIENT)
Dept: VASCULAR SURGERY | Facility: CLINIC | Age: 47
End: 2022-05-24

## 2022-05-24 DIAGNOSIS — I78.1 SPIDER TELANGIECTASIS OF SKIN: Primary | ICD-10-CM

## 2022-05-24 PROCEDURE — S9999 SALES TAX: HCPCS | Performed by: SURGERY

## 2022-05-24 PROCEDURE — 36468 NJX SCLRSNT SPIDER VEINS: CPT | Performed by: SURGERY

## 2022-05-24 NOTE — LETTER
5/24/2022         RE: Muriel Gibson  [address]   [place] [address]        Dear Colleague,    Thank you for referring your patient, Muriel Gibson, to the [facility] [place]. Please see a copy of my visit note below.        Vein Clinic Sclerotherapy Note     Indications:  Residual bilateral lower extremity spider telangiectasias and reticular veins of cosmetic concern; status post ultrasound-guided sclerotherapy right medial calf and left medial calf subcutaneous varicosities and direct vision sclerotherapy on 3/29/2022     Procedure:  Bilateral extremity cosmetic sclerotherapy     Procedure description  Details of procedure including risks of allergic reaction, deep vein thrombosis, ulceration, superficial thrombophlebitis and hyperpigmentation were discussed.  The patient voiced understanding and wished to proceed.  Informed consent was obtained.    The patient feels that she may have had some improvement in her telangiectasias but still was concerned about telangiectasias most noticeable over the anteromedial right leg.  There were other scattered telangiectasias on the left medial and anterolateral leg as well as the medial lateral thighs.  We injected all of these areas with 0.5% polidocanol foam after donning the Syris headlight.  We used a total of 4 syringes of foam.  There were several reticular veins over the legs that we injected as well.    I had the patient perform ankle pumps as we cleaned her legs.  She donned thigh-high compression, walk for 10 minutes and then returned home.  She will return to see us on an as-needed basis.    Sclerotherapy    Date/Time: 5/24/2022 11:46 AM  Performed by: Kit Mccloud MD  Authorized by: Kit Mccloud MD     Time out: Immediately prior to the procedure a time out was called    Type:  Cosmetic  Session:  Full  Procedure side:  Bilateral  Solution/Amount:  .5 POLIDOCANOL  Syringes:  4  Patient tolerance:  Patient  tolerated the procedure well with no immediate complications  Wrap/Hose:  Lonnie Mccloud MD    Dictated using Dragon voice recognition software which may result in transcription errors      Again, thank you for allowing me to participate in the care of your patient.        Sincerely,        Kit Mccloud MD

## 2022-05-24 NOTE — PROGRESS NOTES
Vein Clinic Sclerotherapy Note     Indications:  Residual bilateral lower extremity spider telangiectasias and reticular veins of cosmetic concern; status post ultrasound-guided sclerotherapy right medial calf and left medial calf subcutaneous varicosities and direct vision sclerotherapy on 3/29/2022     Procedure:  Bilateral extremity cosmetic sclerotherapy     Procedure description  Details of procedure including risks of allergic reaction, deep vein thrombosis, ulceration, superficial thrombophlebitis and hyperpigmentation were discussed.  The patient voiced understanding and wished to proceed.  Informed consent was obtained.    The patient feels that she may have had some improvement in her telangiectasias but still was concerned about telangiectasias most noticeable over the anteromedial right leg.  There were other scattered telangiectasias on the left medial and anterolateral leg as well as the medial lateral thighs.  We injected all of these areas with 0.5% polidocanol foam after donning the Syris headlight.  We used a total of 4 syringes of foam.  There were several reticular veins over the legs that we injected as well.    I had the patient perform ankle pumps as we cleaned her legs.  She donned thigh-high compression, walk for 10 minutes and then returned home.  She will return to see us on an as-needed basis.    Sclerotherapy    Date/Time: 5/24/2022 11:46 AM  Performed by: Kit Mccloud MD  Authorized by: Kit Mccloud MD     Time out: Immediately prior to the procedure a time out was called    Type:  Cosmetic  Session:  Full  Procedure side:  Bilateral  Solution/Amount:  .5 POLIDOCANOL  Syringes:  4  Patient tolerance:  Patient tolerated the procedure well with no immediate complications  Wrap/Hose:  Lonnie Mccloud MD    Dictated using Dragon voice recognition software which may result in transcription errors

## 2022-11-23 LAB — PAP SMEAR - HIM PATIENT REPORTED: NEGATIVE

## 2023-03-22 LAB
ALBUMIN (URINE) MG/L: 7.6
ALBUMIN URINE MG/G CR: 19 MG/G CREATININE (ref 0–29)
CHOLEST SERPL-MCNC: 181 MG/DL (ref 50–199)
CHOLEST/HDLC SERPL: 3.5 {RATIO}
CREATININE URINE MG/DL: 40.2 MG/DL
HDLC SERPL-MCNC: 52 MG/DL
LDLC SERPL CALC-MCNC: 96 MG/DL
T4 FREE SERPL-MCNC: 0.91 NG/DL (ref 0.78–2.19)
TRIGL SERPL-MCNC: 165 MG/DL (ref 10–150)
TSH SERPL-ACNC: 1.52 MCU/ML (ref 0.47–4.68)
VLDL - CALC: 33 CALC (ref 2–30)

## 2023-04-11 ENCOUNTER — ANCILLARY PROCEDURE (OUTPATIENT)
Dept: ULTRASOUND IMAGING | Facility: CLINIC | Age: 48
End: 2023-04-11
Attending: SURGERY
Payer: COMMERCIAL

## 2023-04-11 ENCOUNTER — OFFICE VISIT (OUTPATIENT)
Dept: VASCULAR SURGERY | Facility: CLINIC | Age: 48
End: 2023-04-11
Payer: COMMERCIAL

## 2023-04-11 DIAGNOSIS — M79.661 PAIN OF RIGHT LOWER LEG: ICD-10-CM

## 2023-04-11 DIAGNOSIS — M79.661 PAIN OF RIGHT LOWER LEG: Primary | ICD-10-CM

## 2023-04-11 PROCEDURE — 93971 EXTREMITY STUDY: CPT | Mod: RT | Performed by: SURGERY

## 2023-04-11 PROCEDURE — 99202 OFFICE O/P NEW SF 15 MIN: CPT | Performed by: SURGERY

## 2023-04-11 NOTE — PROGRESS NOTES
"[facility] [place] office note  Muriel Gibson  presents with new concerns of right medial calf pain which began about 2 weeks ago.  This was associated with initial ecchymosis which began rather suddenly.  The ecchymosis has remained but discomfort has moved from the calf to the distal medial right thigh.  She states that over the last 2 nights, the pain has been significant enough that it made it difficult for her to sleep.    She has had no pleuritic chest pain or shortness of breath.  She has not noticed any significant swelling of her leg.  She walks 4-hour twice daily without any problem.    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    Exam  Right lower extremity: 3 cm long area of mild ecchymosis and induration on the proximal medial right calf.  There is no erythema or induration extending cephalad.  There is mild tenderness medial to the knee and just in the distal medial right thigh.    No ankle edema or stasis changes.    Assessment  New onset of right medial calf pain which seems consistent with either spontaneous hemorrhage or mild superficial thrombophlebitis.  We discussed the pathophysiology of superficial thrombophlebitis and the fact that, with her great saphenous vein had not been treated, like it of having thrombus extension of the saphenous vein into the deep system is slim to none.    Plan  We will perform a right lower extremity venous ultrasound to rule out deep vein thrombosis.    REJI Mccloud MD, FACS    Dictated using Dragon voice recognition software which may result in transcription errors    ADDENDUM  The ultrasound reveals no evidence of deep vein thrombosis in the right lower extremity.  There is also no evidence of superficial thrombophlebitis.    The patient asked about taking a \"blood thinner\" such as " aspirin.  Given the fact that she is a type I diabetic, I think the risk of hemorrhage related to the gastrointestinal effects of the aspirin would be greater than the protective effect from venous thromboembolism.    Given her long history of vein issues, the vein formula of diosmin may be of benefit in terms of vasomotor effects on the vein walls and in lowering inflammation.  We gave her information and how to order it.    Before she starts diosmin, I asked her to speak with her endocrinologist to be sure that there is no interaction with her insulin or other medications.  She voiced understanding of our discussion and her questions answered.    She will return as scheduled for cosmetic sclerotherapy and about 1 month.    REJI Mccloud MD, FACS    Dictated using Dragon voice recognition software which may result in transcription errors

## 2023-04-11 NOTE — LETTER
4/11/2023         RE: Muriel Gibson  [address] [place] [address]        Dear Colleague,    Thank you for referring your patient, Muriel Gibson, to the [facility] VEIN [facility]IC [employer]. Please see a copy of my visit note below.    [facility] office note  Muriel Gibson  presents with new concerns of right medial calf pain which began about 2 weeks ago.  This was associated with initial ecchymosis which began rather suddenly.  The ecchymosis has remained but discomfort has moved from the calf to the distal medial right thigh.  She states that over the last 2 nights, the pain has been significant enough that it made it difficult for her to sleep.    She has had no pleuritic chest pain or shortness of breath.  She has not noticed any significant swelling of her leg.  She walks 4-hour twice daily without any problem.    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    Exam  Right lower extremity: 3 cm long area of mild ecchymosis and induration on the proximal medial right calf.  There is no erythema or induration extending cephalad.  There is mild tenderness medial to the knee and just in the distal medial right thigh.    No ankle edema or stasis changes.    Assessment  New onset of right medial calf pain which seems consistent with either spontaneous hemorrhage or mild superficial thrombophlebitis.  We discussed the pathophysiology of superficial thrombophlebitis and the fact that, with her great saphenous vein had not been treated, like it of having thrombus extension of the saphenous vein into the deep system is slim to none.    Plan  We will perform a right lower extremity venous ultrasound to rule out deep vein thrombosis.    REJI Mccloud MD, FACS    Dictated using Dragon voice recognition software which may result in  transcription errors      Again, thank you for allowing me to participate in the care of your patient.        Sincerely,        Kit Mccloud MD

## 2023-05-16 ENCOUNTER — OFFICE VISIT (OUTPATIENT)
Dept: VASCULAR SURGERY | Facility: CLINIC | Age: 48
End: 2023-05-16
Payer: COMMERCIAL

## 2023-05-16 DIAGNOSIS — I78.1 SPIDER TELANGIECTASIS OF SKIN: Primary | ICD-10-CM

## 2023-05-16 PROCEDURE — S9999 SALES TAX: HCPCS | Performed by: SURGERY

## 2023-05-16 PROCEDURE — 36468 NJX SCLRSNT SPIDER VEINS: CPT | Performed by: SURGERY

## 2023-05-16 NOTE — PROGRESS NOTES
Vein Clinic Sclerotherapy Note     Indications:  Bilateral lower extremity spider telangiectasias of cosmetic concern     Procedure:  Bilateral lower extremity cosmetic sclerotherapy     Procedure description  Details of procedure including risks of allergic reaction, deep vein thrombosis, ulceration, superficial thrombophlebitis and hyperpigmentation were discussed.  The patient voiced understanding and wished to proceed.  Informed consent was obtained.    I donned the Syris headlight and injected telangiectasias from the proximal thighs to just above the ankles circumferentially using 0.5% polidocanol foam.  These were numerous over the thighs and, to a pressure setting, over the anterolateral legs but also on the medial calves, distal anterolateral legs and in the popliteal fossas bilaterally.  We did a total of 4 syringes of foam (I had her perform ankle pumps.    Cleaned her legs, added on compression, then had her walk for 10 minutes.  She will return in 6 weeks in follow-up or on an as-needed basis  Sclerotherapy    Date/Time: 5/16/2023 5:23 PM    Performed by: Kit Mccloud MD  Authorized by: Kit Mccloud MD    Time out: Immediately prior to the procedure a time out was called    Type:  Cosmetic  Session:  Full  Procedure side:  Bilateral  Solution/Amount:  .5 POLIDOCANOL  Syringes:  4 syringes (12 mL 0.5% polidocanol foam)  Patient tolerance:  Patient tolerated the procedure well with no immediate complications  Wrap/Hose:  Lonnie Mccloud MD    Dictated using Dragon voice recognition software which may result in transcription errors

## 2023-05-16 NOTE — LETTER
5/16/2023         RE: Muriel Gibson  [address]  [place] [address]        Dear Colleague,    Thank you for referring your patient, Muriel Gbison, to the [facility]IN CL[facility] [place]. Please see a copy of my visit note below.        Vein Clinic Sclerotherapy Note     Indications:  Bilateral lower extremity spider telangiectasias of cosmetic concern     Procedure:  Bilateral lower extremity cosmetic sclerotherapy     Procedure description  Details of procedure including risks of allergic reaction, deep vein thrombosis, ulceration, superficial thrombophlebitis and hyperpigmentation were discussed.  The patient voiced understanding and wished to proceed.  Informed consent was obtained.    I donned the Syris headlight and injected telangiectasias from the proximal thighs to just above the ankles circumferentially using 0.5% polidocanol foam.  These were numerous over the thighs and, to a pressure setting, over the anterolateral legs but also on the medial calves, distal anterolateral legs and in the popliteal fossas bilaterally.  We did a total of 4 syringes of foam (I had her perform ankle pumps.    Cleaned her legs, added on compression, then had her walk for 10 minutes.  She will return in 6 weeks in follow-up or on an as-needed basis  Sclerotherapy    Date/Time: 5/16/2023 5:23 PM    Performed by: Kit Mccloud MD  Authorized by: Kit Mccloud MD    Time out: Immediately prior to the procedure a time out was called    Type:  Cosmetic  Session:  Full  Procedure side:  Bilateral  Solution/Amount:  .5 POLIDOCANOL  Syringes:  4 syringes (12 mL 0.5% polidocanol foam)  Patient tolerance:  Patient tolerated the procedure well with no immediate complications  Wrap/Hose:  Lonnie Mccloud MD    Dictated using Dragon voice recognition software which may result in transcription errors      Again, thank you for allowing me to participate in the care of your patient.         Sincerely,        Kit Mccloud MD

## 2023-08-25 LAB
BUN SERPL-MCNC: 15 MG/DL (ref 10–20)
BUN/CREATININE RATIO: 21.3 (ref 12–20)
CALCIUM SERPL-MCNC: 9.3 MG/DL (ref 8.4–10.2)
CHLORIDE SERPLBLD-SCNC: 101 MMOL/L (ref 98–107)
CO2 SERPL-SCNC: 28 MMOL/L (ref 22–30)
CREAT SERPL-MCNC: 0.61 MG/DL (ref 0.52–1.04)
GLUCOSE SERPL-MCNC: 137 MG/DL (ref 70–99)
HBA1C MFR BLD: 5.6 % (ref 0–5.7)
POTASSIUM SERPL-SCNC: 4.9 MMOL/L (ref 3.5–5.1)
SODIUM SERPL-SCNC: 139 MMOL/L (ref 135–145)

## 2023-09-06 ENCOUNTER — HOSPITAL ENCOUNTER (OUTPATIENT)
Dept: MAMMOGRAPHY | Facility: CLINIC | Age: 48
Discharge: HOME | End: 2023-09-06
Attending: OBSTETRICS & GYNECOLOGY | Admitting: OBSTETRICS & GYNECOLOGY
Payer: COMMERCIAL

## 2023-09-06 DIAGNOSIS — Z12.31 VISIT FOR SCREENING MAMMOGRAM: ICD-10-CM

## 2023-09-06 PROCEDURE — 77067 SCR MAMMO BI INCL CAD: CPT

## 2023-09-21 ENCOUNTER — TRANSFERRED RECORDS (OUTPATIENT)
Dept: FAMILY MEDICINE | Facility: CLINIC | Age: 48
End: 2023-09-21

## 2023-09-21 ENCOUNTER — OFFICE VISIT (OUTPATIENT)
Dept: FAMILY MEDICINE | Facility: CLINIC | Age: 48
End: 2023-09-21

## 2023-09-21 VITALS
RESPIRATION RATE: 20 BRPM | BODY MASS INDEX: 26.06 KG/M2 | DIASTOLIC BLOOD PRESSURE: 62 MMHG | SYSTOLIC BLOOD PRESSURE: 118 MMHG | TEMPERATURE: 97.7 F | HEART RATE: 60 BPM | HEIGHT: 65 IN | WEIGHT: 156.4 LBS

## 2023-09-21 DIAGNOSIS — R30.0 DYSURIA: Primary | ICD-10-CM

## 2023-09-21 DIAGNOSIS — Z71.89 ACP (ADVANCE CARE PLANNING): ICD-10-CM

## 2023-09-21 LAB
APPEARANCE UR: ABNORMAL
BACTERIA, UR: ABNORMAL
BILIRUB UR QL: ABNORMAL
CASTS/LPF: ABNORMAL
COLOR UR: YELLOW
EP/HPF: ABNORMAL
GLUCOSE URINE: ABNORMAL MG/DL
HGB UR QL: ABNORMAL
KETONES UR QL: ABNORMAL MG/DL
MISC.: ABNORMAL
NITRITE UR QL STRIP: ABNORMAL
PH UR STRIP: 6 PH (ref 5–7)
PROT UR QL: ABNORMAL MG/DL
RBC, UR MICRO: ABNORMAL (ref ?–2)
SP GR UR STRIP: 1.01
UROBILINOGEN UR QL STRIP: 0.2 EU/DL (ref 0.2–1)
WBC #/AREA URNS HPF: ABNORMAL /[HPF]
WBC, UR MICRO: ABNORMAL (ref ?–2)

## 2023-09-21 PROCEDURE — 81001 URINALYSIS AUTO W/SCOPE: CPT | Performed by: FAMILY MEDICINE

## 2023-09-21 PROCEDURE — 99202 OFFICE O/P NEW SF 15 MIN: CPT | Performed by: FAMILY MEDICINE

## 2023-09-21 PROCEDURE — 87086 URINE CULTURE/COLONY COUNT: CPT | Mod: 90 | Performed by: FAMILY MEDICINE

## 2023-09-21 RX ORDER — INSULIN GLARGINE 300 U/ML
INJECTION, SOLUTION SUBCUTANEOUS
COMMUNITY
Start: 2023-08-22

## 2023-09-21 RX ORDER — PROCHLORPERAZINE 25 MG/1
SUPPOSITORY RECTAL
COMMUNITY
Start: 2023-08-23

## 2023-09-21 RX ORDER — IBUPROFEN 600 MG/1
TABLET ORAL SEE ADMIN INSTRUCTIONS
COMMUNITY
Start: 2023-08-29

## 2023-09-21 RX ORDER — NITROFURANTOIN 25; 75 MG/1; MG/1
100 CAPSULE ORAL 2 TIMES DAILY
Qty: 10 CAPSULE | Refills: 0 | Status: SHIPPED | OUTPATIENT
Start: 2023-09-21 | End: 2024-02-07

## 2023-09-21 RX ORDER — VALACYCLOVIR HYDROCHLORIDE 500 MG/1
1 TABLET, FILM COATED ORAL
COMMUNITY
Start: 2023-03-16 | End: 2024-02-07

## 2023-09-21 RX ORDER — SERTRALINE HYDROCHLORIDE 100 MG/1
1 TABLET, FILM COATED ORAL
COMMUNITY
Start: 2023-08-28

## 2023-09-21 NOTE — PROGRESS NOTES
SUBJECTIVE: Muriel Gibson is a 47 year old female who complains of urinary frequency, urgency and dysuria x 10 days, without flank pain, fever, chills, or abnormal vaginal discharge or bleeding. Pt did have gretchen itching    Pt has T1DM- sugars a little high    Pt tried monistat-no better    Pt then tried Azo    Patient Active Problem List   Diagnosis    DKA (diabetic ketoacidoses)    CARDIOVASCULAR SCREENING; LDL GOAL LESS THAN 100    Type 1 diabetes, HbA1c goal < 7% (H)-Dr Colvin    [facility]    Panic disorder    Basal cell carcinoma of back    Palpitations    ACP (advance care planning)       Past Medical History:   Diagnosis Date    Anemia     Diabetes mellitus - type 1     since age 25 yrs. old    History of basal cell carcinoma     Palpitations     PACs, PVCs       Family History   Problem Relation Age of Onset    Other Cancer Father     Hypertension Mother     Hyperlipidemia Mother     Thyroid Disease Mother     Other Cancer Maternal Grandfather     Colon Cancer No family hx of        Social History     Socioeconomic History    Marital status:      Spouse name: Not on file    Number of children: 1    Years of education: Not on file    Highest education level: Not on file   Occupational History    Not on file   Tobacco Use    Smoking status: Never    Smokeless tobacco: Never   Substance and Sexual Activity    Alcohol use: No     Comment: very rarely    Drug use: No    Sexual activity: Yes   Other Topics Concern    Parent/sibling w/ CABG, MI or angioplasty before 65F 55M? Not Asked     Service Not Asked    Blood Transfusions Not Asked    Caffeine Concern Not Asked    Occupational Exposure Not Asked    Hobby Hazards Not Asked    Sleep Concern Not Asked    Stress Concern Not Asked    Weight Concern Not Asked    Special Diet Yes     Comment: low carb due to diabetic    Back Care Not Asked    Exercise Yes     Comment: walking everyday    Bike Helmet Not Asked    Seat Belt Not Asked    Self-Exams  Not Asked   Social History Narrative    Not on file     Social Determinants of Health     Financial Resource Strain: Not on file   Food Insecurity: Not on file   Transportation Needs: Not on file   Physical Activity: Not on file   Stress: Not on file   Social Connections: Not on file   Interpersonal Safety: Not on file   Housing Stability: Not on file       Past Surgical History:   Procedure Laterality Date    APPENDECTOMY      age 14 yrs.    COLONOSCOPY N/A 09/12/2016    Procedure: COLONOSCOPY;  Surgeon: Tanya Blanton MD;  Location:  GI    HERNIA REPAIR  01/01/2006     Continuous Blood Gluc Sensor (DEXCOM G6 SENSOR) MISC, CHANGE EVERY 10 DAYS  Continuous Blood Gluc Transmit (DEXCOM G6 TRANSMITTER) MISC, USE EVERY 90 DAYS  Glucagon, rDNA, (GLUCAGON EMERGENCY) 1 MG KIT, See Admin Instructions  insulin lispro (HUMALOG) 100 UNIT/ML injection, Inject Subcutaneous 3 times daily (with meals) 3-5 units after breakfast and dinner, and 1-2 units after lunch per sliding scale BG  lisinopril (ZESTRIL) 10 MG tablet, Take 10 mg by mouth daily  sertraline (ZOLOFT) 100 MG tablet, Take 1 tablet by mouth daily at 2 pm  TOUJEO SOLOSTAR 300 UNIT/ML (1 units dial) pen, USE UP TO 50 UNITS PER DAY  valACYclovir (VALTREX) 500 MG tablet, Take 1 tablet by mouth 2 times daily PRN  EPINEPHrine (ANY BX GENERIC EQUIV) 0.3 MG/0.3ML injection 2-pack, Inject 0.3 mLs (0.3 mg) into the muscle once as needed for anaphylaxis (Patient not taking: No sig reported)    No current facility-administered medications on file prior to visit.       Allergies: Azithromycin and Cefuroxime      Immunization History   Administered Date(s) Administered    COVID-19 MONOVALENT 12+ (Pfizer) 01/26/2021, 02/16/2021, 11/19/2021    MMR 02/27/2002    TD,PF 7+ (Tenivac) 07/24/2020    TDAP Vaccine (Adacel) 01/01/2009    TDAP Vaccine (Boostrix) 11/15/2012            OBJECTIVE: Appears well, in no apparent distress.  Vital signs are normal. The abdomen is soft without  tenderness, guarding, mass, rebound or organomegaly. No CVA tenderness or inguinal adenopathy noted. Urine dipstick shows positive for WBC's, positive for RBC's, and positive for bacturia.  Micro exam: 2-5 WBC's per HPF, 2-5 RBC's per HPF, and mod+ bacteria.     ASSESSMENT: Possible UTI uncomplicated without evidence of pyelonephritis- Bv also a possibility but will treat for UTI pending cx, consider vaginal wet prep if symptoms persist    PLAN: Treatment per orders - also push fluids, may use Pyridium OTC prn. Call or return to clinic prn if these symptoms worsen or fail to improve as anticipated.

## 2023-09-22 ENCOUNTER — TRANSFERRED RECORDS (OUTPATIENT)
Dept: FAMILY MEDICINE | Facility: CLINIC | Age: 48
End: 2023-09-22

## 2023-09-23 LAB — URINE VOIDED CULTURE: NORMAL

## 2024-02-07 ENCOUNTER — OFFICE VISIT (OUTPATIENT)
Dept: CARDIOLOGY | Facility: CLINIC | Age: 49
End: 2024-02-07
Payer: COMMERCIAL

## 2024-02-07 VITALS
HEIGHT: 65 IN | DIASTOLIC BLOOD PRESSURE: 80 MMHG | OXYGEN SATURATION: 100 % | HEART RATE: 73 BPM | WEIGHT: 153 LBS | BODY MASS INDEX: 25.49 KG/M2 | SYSTOLIC BLOOD PRESSURE: 122 MMHG

## 2024-02-07 DIAGNOSIS — Z82.49 FAMILY HISTORY OF FIRST-DEGREE RELATIVE WITH CARDIOMYOPATHY: ICD-10-CM

## 2024-02-07 DIAGNOSIS — I10 BENIGN ESSENTIAL HYPERTENSION: ICD-10-CM

## 2024-02-07 DIAGNOSIS — R07.2 PRECORDIAL CHEST PAIN: Primary | ICD-10-CM

## 2024-02-07 DIAGNOSIS — E10.9 TYPE 1 DIABETES MELLITUS WITHOUT COMPLICATION (H): ICD-10-CM

## 2024-02-07 PROCEDURE — 99204 OFFICE O/P NEW MOD 45 MIN: CPT | Performed by: INTERNAL MEDICINE

## 2024-02-07 PROCEDURE — 93000 ELECTROCARDIOGRAM COMPLETE: CPT | Performed by: INTERNAL MEDICINE

## 2024-02-07 RX ORDER — LISINOPRIL 10 MG/1
10 TABLET ORAL EVERY MORNING
COMMUNITY
Start: 2024-02-07

## 2024-02-07 NOTE — PROGRESS NOTES
SERVICE DATE: February 7, 2024      PRIMARY CARE PROVIDER:  Fredi Gonzalez  [address], [id number]  [facility] [address]    REASON FOR VISIT:  Precordial chest pain.    HISTORY OF PRESENT ILLNESS:  Muriel Gibson is 48 year old female, new to my practice, previously seen by cardiologists Dr. Liz for lower extremity venous varicosity and Dr. Landry for palpitations in 2016.     She is 48 years old, has a history of type 1 diabetes mellitus for the last 26 years for which she is on insulin and it is extremely well-controlled with an A1c of 5.6%, creatinine of 0.6,  hypertension, BMI 25, never tobacco user.    For the last week she has been getting intermittent episodes of left-sided precordial chest pain, sharp, lasting a few seconds but can be recurrent, radiating to the left arm.  No prior history of coronary artery disease.    Family history is known for cardiomyopathy in her mother and maternal grandmother.  No family history of coronary artery disease.  Mother also has atrial fibrillation.    Labs reviewed - triglycerides 165, HDL 52, LDL 96, creatinine 0.6, potassium 4.9, HbA1c 5.6%, normal TSH.    ECG done today shows normal sinus rhythm with normal cardiac intervals.  No ischemia or infarct.    No recent cardiac imaging for over a decade.    On exam - Regular heart sounds, no murmur, no carotid bruit.  Lungs are clear to auscultation.  No lower extremity edema.    DIAGNOSES/ASSESSMENT:  Precordial chest pain.  Muriel has new onset chest pain, and a long history of type 1 diabetes mellitus which is a significant risk factor for premature coronary artery disease.  Therefore, I recommend a CT coronary angiogram.  Type 1 diabetes mellitus, on insulin, well-controlled, with normal renal function.  Benign essential hypertension.  She reports episodic systolic hypertension in the 160s, typically in the evenings.  However she has been taking her lisinopril at night.  I recommend she switch the  "lisinopril to morning.  Hyperlipidemia screening.  Her last LDL was 96.  Goal is 70 or below.  She will get an up-to-date fasting lipid panel when she sees me next so we can make an informed decision about statin therapy.  Family history of cardiomyopathy in first-degree relative i.e. her mother and maternal grandmother.  She will require a screening transthoracic echocardiogram since her last study was over a decade ago.  Never tobacco user.    PLAN:  CT coronary angiogram.  Patient does not have an iodine or contrast allergy.  Transthoracic echocardiogram.  Take lisinopril 10 mg daily in the morning (instead of bedtime as she is currently taking).  Follow-up with me with results of cardiac imaging tests, and fasting lipid panel.      Sugey Valenzuela MD      New patient.  Today's clinic visit entailed:  Review of external notes as documented elsewhere in note  Review of the result(s) of each unique test - CBC, HbA1c, renal panel, lipid panel, thyroid panel, ECG.  Ordering of each unique test  Prescription drug management  The level of medical decision making during this visit was of moderate complexity.          Vitals: /80   Pulse 73   Ht 1.651 m (5' 5\")   Wt 69.4 kg (153 lb)   SpO2 100%   BMI 25.46 kg/m    Wt Readings from Last 5 Encounters:   02/07/24 69.4 kg (153 lb)   09/21/23 70.9 kg (156 lb 6.4 oz)   08/10/21 69.9 kg (154 lb)   12/30/19 69.9 kg (154 lb)   06/24/19 68.5 kg (151 lb)         Orders Placed This Encounter   Procedures    Lipid Profile    Follow-Up with Cardiology    EKG 12-lead complete w/read - Clinics (performed today)    Echocardiogram Complete           CURRENT MEDICATIONS:  Current Outpatient Medications   Medication Sig Dispense Refill    Continuous Blood Gluc Sensor (DEXCOM G6 SENSOR) MISC CHANGE EVERY 10 DAYS      Continuous Blood Gluc Transmit (DEXCOM G6 TRANSMITTER) MISC USE EVERY 90 DAYS      Glucagon, rDNA, (GLUCAGON EMERGENCY) 1 MG KIT See Admin Instructions      " insulin lispro (HUMALOG) 100 UNIT/ML injection Inject Subcutaneous 3 times daily (with meals) 3-5 units after breakfast and dinner, and 1-2 units after lunch per sliding scale BG      lisinopril (ZESTRIL) 10 MG tablet Take 1 tablet (10 mg) by mouth every morning      sertraline (ZOLOFT) 100 MG tablet Take 1 tablet by mouth daily at 2 pm      TOUJEO SOLOSTAR 300 UNIT/ML (1 units dial) pen USE UP TO 50 UNITS PER DAY      EPINEPHrine (ANY BX GENERIC EQUIV) 0.3 MG/0.3ML injection 2-pack Inject 0.3 mLs (0.3 mg) into the muscle once as needed for anaphylaxis (Patient not taking: Reported on 10/13/2021) 1 each 0         ALLERGIES:  Allergies   Allergen Reactions    Azithromycin Nausea and Diarrhea    Cefuroxime Nausea and Diarrhea    Food Swelling     Avac[place]    Lumineux Clean-Fresh Toothpste [Sodium Fluoride] Swelling     Toothpaste with fredi        PAST MEDICAL HISTORY:    Past Medical History:   Diagnosis Date    Anemia     Diabetes mellitus - type 1     since age 25 yrs. old    History of basal cell carcinoma     Palpitations     PACs, PVCs       PAST SURGICAL HISTORY:    Past Surgical History:   Procedure Laterality Date    APPENDECTOMY      age 14 yrs.    COLONOSCOPY N/A 09/12/2016    Procedure: COLONOSCOPY;  Surgeon: Tanya Blanton MD;  Location: [facility]    HERNIA REPAIR  01/01/2006       FAMILY HISTORY:    Family History   Problem Relation Age of Onset    Other Cancer Father     Hypertension Mother     Hyperlipidemia Mother     Thyroid Disease Mother     Other Cancer Maternal Grandfather     Colon Cancer No family hx of        SOCIAL HISTORY:    Social History     Socioeconomic History    Marital status:      Spouse name: None    Number of children: 1    Years of education: None    Highest education level: None   Tobacco Use    Smoking status: Never    Smokeless tobacco: Never   Substance and Sexual Activity    Alcohol use: No     Comment: very rarely    Drug use: No    Sexual activity: Yes   Other  Topics Concern    Special Diet Yes     Comment: low carb due to diabetic    Exercise Yes     Comment: walking everyday

## 2024-02-07 NOTE — LETTER
2/7/2024    Fredi Gonzalez MD  [address], [address]  [facility] [address]    RE: Muriel Gibson       Dear Colleague,     I had the pleasure of seeing Muriel Gibson in the [facility].    SERVICE DATE: February 7, 2024      PRIMARY CARE PROVIDER:  Fredi Gonzalez  [address], [id number]  [facility] [address]    REASON FOR VISIT:  Precordial chest pain.    HISTORY OF PRESENT ILLNESS:  Muriel Gibson is 48 year old female, new to my practice, previously seen by cardiologists Dr. Liz for lower extremity venous varicosity and Dr. Landry for palpitations in 2016.     She is 48 years old, has a history of type 1 diabetes mellitus for the last 26 years for which she is on insulin and it is extremely well-controlled with an A1c of 5.6%, creatinine of 0.6,  hypertension, BMI 25, never tobacco user.    For the last week she has been getting intermittent episodes of left-sided precordial chest pain, sharp, lasting a few seconds but can be recurrent, radiating to the left arm.  No prior history of coronary artery disease.    Family history is known for cardiomyopathy in her mother and maternal grandmother.  No family history of coronary artery disease.  Mother also has atrial fibrillation.    Labs reviewed - triglycerides 165, HDL 52, LDL 96, creatinine 0.6, potassium 4.9, HbA1c 5.6%, normal TSH.    ECG done today shows normal sinus rhythm with normal cardiac intervals.  No ischemia or infarct.    No recent cardiac imaging for over a decade.    On exam - Regular heart sounds, no murmur, no carotid bruit.  Lungs are clear to auscultation.  No lower extremity edema.    DIAGNOSES/ASSESSMENT:  Precordial chest pain.  Muriel has new onset chest pain, and a long history of type 1 diabetes mellitus which is a significant risk factor for premature coronary artery disease.  Therefore, I recommend a CT coronary angiogram.  Type 1 diabetes mellitus, on insulin, well-controlled, with normal renal  "function.  Benign essential hypertension.  She reports episodic systolic hypertension in the 160s, typically in the evenings.  However she has been taking her lisinopril at night.  I recommend she switch the lisinopril to morning.  Hyperlipidemia screening.  Her last LDL was 96.  Goal is 70 or below.  She will get an up-to-date fasting lipid panel when she sees me next so we can make an informed decision about statin therapy.  Family history of cardiomyopathy in first-degree relative i.e. her mother and maternal grandmother.  She will require a screening transthoracic echocardiogram since her last study was over a decade ago.  Never tobacco user.    PLAN:  CT coronary angiogram.  Patient does not have an iodine or contrast allergy.  Transthoracic echocardiogram.  Take lisinopril 10 mg daily in the morning (instead of bedtime as she is currently taking).  Follow-up with me with results of cardiac imaging tests, and fasting lipid panel.      Sugey Valenzuela MD      New patient.  Today's clinic visit entailed:  Review of external notes as documented elsewhere in note  Review of the result(s) of each unique test - CBC, HbA1c, renal panel, lipid panel, thyroid panel, ECG.  Ordering of each unique test  Prescription drug management  The level of medical decision making during this visit was of moderate complexity.          Vitals: /80   Pulse 73   Ht 1.651 m (5' 5\")   Wt 69.4 kg (153 lb)   SpO2 100%   BMI 25.46 kg/m    Wt Readings from Last 5 Encounters:   02/07/24 69.4 kg (153 lb)   09/21/23 70.9 kg (156 lb 6.4 oz)   08/10/21 69.9 kg (154 lb)   12/30/19 69.9 kg (154 lb)   06/24/19 68.5 kg (151 lb)         Orders Placed This Encounter   Procedures    Lipid Profile    Follow-Up with Cardiology    EKG 12-lead complete w/read - Clinics (performed today)    Echocardiogram Complete           CURRENT MEDICATIONS:  Current Outpatient Medications   Medication Sig Dispense Refill    Continuous Blood Gluc Sensor " (DEXCOM G6 SENSOR) MISC CHANGE EVERY 10 DAYS      Continuous Blood Gluc Transmit (DEXCOM G6 TRANSMITTER) MISC USE EVERY 90 DAYS      Glucagon, rDNA, (GLUCAGON EMERGENCY) 1 MG KIT See Admin Instructions      insulin lispro (HUMALOG) 100 UNIT/ML injection Inject Subcutaneous 3 times daily (with meals) 3-5 units after breakfast and dinner, and 1-2 units after lunch per sliding scale BG      lisinopril (ZESTRIL) 10 MG tablet Take 1 tablet (10 mg) by mouth every morning      sertraline (ZOLOFT) 100 MG tablet Take 1 tablet by mouth daily at 2 pm      TOUJEO SOLOSTAR 300 UNIT/ML (1 units dial) pen USE UP TO 50 UNITS PER DAY      EPINEPHrine (ANY BX GENERIC EQUIV) 0.3 MG/0.3ML injection 2-pack Inject 0.3 mLs (0.3 mg) into the muscle once as needed for anaphylaxis (Patient not taking: Reported on 10/13/2021) 1 each 0         ALLERGIES:  Allergies   Allergen Reactions    Azithromycin Nausea and Diarrhea    Cefuroxime Nausea and Diarrhea    Food Swelling     [place]    Lumineux Clean-Fresh Toothpste [Sodium Fluoride] Swelling     Toothpaste with fredi        PAST MEDICAL HISTORY:    Past Medical History:   Diagnosis Date    Anemia     Diabetes mellitus - type 1     since age 25 yrs. old    History of basal cell carcinoma     Palpitations     PACs, PVCs       PAST SURGICAL HISTORY:    Past Surgical History:   Procedure Laterality Date    APPENDECTOMY      age 14 yrs.    COLONOSCOPY N/A 09/12/2016    Procedure: COLONOSCOPY;  Surgeon: Tanya Blanton MD;  Location: [facility]    HERNIA REPAIR  01/01/2006       FAMILY HISTORY:    Family History   Problem Relation Age of Onset    Other Cancer Father     Hypertension Mother     Hyperlipidemia Mother     Thyroid Disease Mother     Other Cancer Maternal Grandfather     Colon Cancer No family hx of        SOCIAL HISTORY:    Social History     Socioeconomic History    Marital status:      Spouse name: None    Number of children: 1    Years of education: None    Highest  education level: None   Tobacco Use    Smoking status: Never    Smokeless tobacco: Never   Substance and Sexual Activity    Alcohol use: No     Comment: very rarely    Drug use: No    Sexual activity: Yes   Other Topics Concern    Special Diet Yes     Comment: low carb due to diabetic    Exercise Yes     Comment: walking everyday       Thank you for allowing me to participate in the care of your patient.      Sincerely,     Sguey Valenzuela MD     [facility]  cc:   Sugey Valenzuela MD  [address] [place] [id number]

## 2024-02-27 ENCOUNTER — HOSPITAL ENCOUNTER (OUTPATIENT)
Dept: CARDIOLOGY | Facility: CLINIC | Age: 49
Discharge: HOME | End: 2024-02-27
Attending: INTERNAL MEDICINE
Payer: COMMERCIAL

## 2024-02-27 VITALS — DIASTOLIC BLOOD PRESSURE: 72 MMHG | HEART RATE: 68 BPM | SYSTOLIC BLOOD PRESSURE: 135 MMHG

## 2024-02-27 DIAGNOSIS — I10 BENIGN ESSENTIAL HYPERTENSION: ICD-10-CM

## 2024-02-27 DIAGNOSIS — Z82.49 FAMILY HISTORY OF FIRST-DEGREE RELATIVE WITH CARDIOMYOPATHY: ICD-10-CM

## 2024-02-27 DIAGNOSIS — E10.9 TYPE 1 DIABETES MELLITUS WITHOUT COMPLICATION (H): ICD-10-CM

## 2024-02-27 DIAGNOSIS — R07.2 PRECORDIAL CHEST PAIN: ICD-10-CM

## 2024-02-27 LAB
BI-PLANE LVEF ECHO: NORMAL
CREAT BLD-MCNC: 0.6 MG/DL (ref 0.5–1)
EGFRCR SERPLBLD CKD-EPI 2021: >60 ML/MIN/1.73M2

## 2024-02-27 PROCEDURE — 82565 ASSAY OF CREATININE: CPT

## 2024-02-27 PROCEDURE — 250N000011 HC RX IP 250 OP 636: Performed by: INTERNAL MEDICINE

## 2024-02-27 PROCEDURE — 250N000013 HC RX MED GY IP 250 OP 250 PS 637: Performed by: INTERNAL MEDICINE

## 2024-02-27 PROCEDURE — 75574 CT ANGIO HRT W/3D IMAGE: CPT

## 2024-02-27 PROCEDURE — 93306 TTE W/DOPPLER COMPLETE: CPT

## 2024-02-27 PROCEDURE — 93306 TTE W/DOPPLER COMPLETE: CPT | Mod: 26 | Performed by: INTERNAL MEDICINE

## 2024-02-27 PROCEDURE — 75574 CT ANGIO HRT W/3D IMAGE: CPT | Mod: 26 | Performed by: INTERNAL MEDICINE

## 2024-02-27 RX ORDER — DIPHENHYDRAMINE HCL 25 MG
25 CAPSULE ORAL
Status: DISCONTINUED | OUTPATIENT
Start: 2024-02-27 | End: 2024-02-28 | Stop reason: HOSPADM

## 2024-02-27 RX ORDER — DILTIAZEM HCL 60 MG
120 TABLET ORAL
Status: DISCONTINUED | OUTPATIENT
Start: 2024-02-27 | End: 2024-02-28 | Stop reason: HOSPADM

## 2024-02-27 RX ORDER — ONDANSETRON 2 MG/ML
4 INJECTION INTRAMUSCULAR; INTRAVENOUS
Status: DISCONTINUED | OUTPATIENT
Start: 2024-02-27 | End: 2024-02-28 | Stop reason: HOSPADM

## 2024-02-27 RX ORDER — METOPROLOL TARTRATE 25 MG/1
25-100 TABLET, FILM COATED ORAL
Status: COMPLETED | OUTPATIENT
Start: 2024-02-27 | End: 2024-02-27

## 2024-02-27 RX ORDER — ACYCLOVIR 200 MG/1
0-1 CAPSULE ORAL
Status: DISCONTINUED | OUTPATIENT
Start: 2024-02-27 | End: 2024-02-28 | Stop reason: HOSPADM

## 2024-02-27 RX ORDER — METOPROLOL TARTRATE 1 MG/ML
5-15 INJECTION, SOLUTION INTRAVENOUS
Status: DISCONTINUED | OUTPATIENT
Start: 2024-02-27 | End: 2024-02-28 | Stop reason: HOSPADM

## 2024-02-27 RX ORDER — METHYLPREDNISOLONE SODIUM SUCCINATE 125 MG/2ML
125 INJECTION, POWDER, LYOPHILIZED, FOR SOLUTION INTRAMUSCULAR; INTRAVENOUS
Status: DISCONTINUED | OUTPATIENT
Start: 2024-02-27 | End: 2024-02-28 | Stop reason: HOSPADM

## 2024-02-27 RX ORDER — NITROGLYCERIN 0.4 MG/1
0.4 TABLET SUBLINGUAL
Status: DISCONTINUED | OUTPATIENT
Start: 2024-02-27 | End: 2024-02-28 | Stop reason: HOSPADM

## 2024-02-27 RX ORDER — DIPHENHYDRAMINE HYDROCHLORIDE 50 MG/ML
25-50 INJECTION INTRAMUSCULAR; INTRAVENOUS
Status: DISCONTINUED | OUTPATIENT
Start: 2024-02-27 | End: 2024-02-28 | Stop reason: HOSPADM

## 2024-02-27 RX ORDER — IOPAMIDOL 755 MG/ML
500 INJECTION, SOLUTION INTRAVASCULAR ONCE
Status: COMPLETED | OUTPATIENT
Start: 2024-02-27 | End: 2024-02-27

## 2024-02-27 RX ORDER — DILTIAZEM HYDROCHLORIDE 5 MG/ML
10-15 INJECTION INTRAVENOUS
Status: DISCONTINUED | OUTPATIENT
Start: 2024-02-27 | End: 2024-02-28 | Stop reason: HOSPADM

## 2024-02-27 RX ORDER — IVABRADINE 5 MG/1
5-15 TABLET, FILM COATED ORAL
Status: COMPLETED | OUTPATIENT
Start: 2024-02-27 | End: 2024-02-27

## 2024-02-27 RX ADMIN — IVABRADINE 10 MG: 5 TABLET, FILM COATED ORAL at 08:12

## 2024-02-27 RX ADMIN — IOPAMIDOL 110 ML: 755 INJECTION, SOLUTION INTRAVENOUS at 09:35

## 2024-02-27 RX ADMIN — NITROGLYCERIN 0.4 MG: 0.4 TABLET SUBLINGUAL at 09:11

## 2024-02-27 RX ADMIN — METOPROLOL TARTRATE 50 MG: 50 TABLET, FILM COATED ORAL at 08:12

## 2024-03-08 ENCOUNTER — OFFICE VISIT (OUTPATIENT)
Dept: CARDIOLOGY | Facility: CLINIC | Age: 49
End: 2024-03-08
Attending: INTERNAL MEDICINE
Payer: COMMERCIAL

## 2024-03-08 ENCOUNTER — LAB (OUTPATIENT)
Dept: LAB | Facility: CLINIC | Age: 49
End: 2024-03-08
Payer: COMMERCIAL

## 2024-03-08 VITALS
HEIGHT: 65 IN | DIASTOLIC BLOOD PRESSURE: 78 MMHG | OXYGEN SATURATION: 99 % | BODY MASS INDEX: 25.49 KG/M2 | SYSTOLIC BLOOD PRESSURE: 118 MMHG | WEIGHT: 153 LBS | HEART RATE: 54 BPM

## 2024-03-08 DIAGNOSIS — I10 BENIGN ESSENTIAL HYPERTENSION: ICD-10-CM

## 2024-03-08 DIAGNOSIS — E10.9 TYPE 1 DIABETES MELLITUS WITHOUT COMPLICATION (H): ICD-10-CM

## 2024-03-08 DIAGNOSIS — R07.2 PRECORDIAL CHEST PAIN: ICD-10-CM

## 2024-03-08 LAB
CHOLEST SERPL-MCNC: 147 MG/DL
FASTING STATUS PATIENT QL REPORTED: NO
HDLC SERPL-MCNC: 49 MG/DL
LDLC SERPL CALC-MCNC: 85 MG/DL
NONHDLC SERPL-MCNC: 98 MG/DL
TRIGL SERPL-MCNC: 66 MG/DL

## 2024-03-08 PROCEDURE — 99214 OFFICE O/P EST MOD 30 MIN: CPT | Performed by: INTERNAL MEDICINE

## 2024-03-08 PROCEDURE — 36415 COLL VENOUS BLD VENIPUNCTURE: CPT | Performed by: INTERNAL MEDICINE

## 2024-03-08 PROCEDURE — 80061 LIPID PANEL: CPT | Performed by: INTERNAL MEDICINE

## 2024-03-08 NOTE — LETTER
"3/8/2024    Fredi Gonzalez MD  [address], [id number]  [facility] [address]    RE: Muriel Gibson       Dear Colleague,     I had the pleasure of seeing Muriel Gibson in the [facility].    SERVICE DATE: March 8, 2024      PRIMARY CARE PROVIDER:  Fredi Gonzalez  [address], [id number]  [facility] [address]    REASON FOR VISIT:  Follow-up of precordial chest pain with test results.    HISTORY OF PRESENT ILLNESS:  Muriel Gibson is 48 year old female, known to have type 1 diabetes on insulin, well-controlled with an A1c of 5.6% and normal renal function, hypertension, BMI 25, never tobacco user.    I had seen her recently for left-sided chest pain and family history of cardiomyopathy.  She has completed interval testing.  In the interval, her chest pain is resolved.  She walks her dog for an hour 2 times a day.    CT coronary angiogram shows calcium score of 0, no coronary artery disease.    Transthoracic echocardiogram dated 2/27/2024 showed normal LVEF of 61%, no regional wall motion abnormalities, normal diastolic function, normal right ventricular systolic function, normal atrial size, no valve disease.    Labs reviewed.  Lipid panel shows LDL of 85, normal thyroid panel, normal renal panel with a creatinine of 0.6.    DIAGNOSES/ASSESSMENT:  Noncardiac precordial chest pain, resolved.  Angiographically normal coronary arteries.  Family history of cardiomyopathy.  Muriel's echocardiogram shows normal ventricular function.    PLAN:  Patient reassured about test results.  I congratulated her about her healthy lifestyle.  Follow-up with cardiology, as needed.      Sugey Valenzuela MD      Established patient.   The level of medical decision making during this visit was of moderate complexity.        Vitals: /78   Pulse 54   Ht 1.651 m (5' 5\")   Wt 69.4 kg (153 lb)   SpO2 99%   BMI 25.46 kg/m    Wt Readings from Last 5 Encounters:   03/08/24 69.4 kg (153 lb)   02/07/24 69.4 " kg (153 lb)   09/21/23 70.9 kg (156 lb 6.4 oz)   08/10/21 69.9 kg (154 lb)   12/30/19 69.9 kg (154 lb)           CURRENT MEDICATIONS:  Current Outpatient Medications   Medication Sig Dispense Refill    Continuous Blood Gluc Sensor (DEXCOM G6 SENSOR) MISC CHANGE EVERY 10 DAYS      Continuous Blood Gluc Transmit (DEXCOM G6 TRANSMITTER) MISC USE EVERY 90 DAYS      Glucagon, rDNA, (GLUCAGON EMERGENCY) 1 MG KIT See Admin Instructions      insulin lispro (HUMALOG) 100 UNIT/ML injection Inject Subcutaneous 3 times daily (with meals) 3-5 units after breakfast and dinner, and 1-2 units after lunch per sliding scale BG      lisinopril (ZESTRIL) 10 MG tablet Take 1 tablet (10 mg) by mouth every morning      sertraline (ZOLOFT) 100 MG tablet Take 1 tablet by mouth daily at 2 pm      TOUJEO SOLOSTAR 300 UNIT/ML (1 units dial) pen USE UP TO 50 UNITS PER DAY      EPINEPHrine (ANY BX GENERIC EQUIV) 0.3 MG/0.3ML injection 2-pack Inject 0.3 mLs (0.3 mg) into the muscle once as needed for anaphylaxis (Patient not taking: Reported on 10/13/2021) 1 each 0         ALLERGIES:  Allergies   Allergen Reactions    Azithromycin Nausea and Diarrhea    Cefuroxime Nausea and Diarrhea    Food Swelling     [place]    Lumineux Clean-Fresh Toothpste [Sodium Fluoride] Swelling     Toothpaste with fredi        PAST MEDICAL HISTORY:    Past Medical History:   Diagnosis Date    Anemia     Diabetes mellitus - type 1     since age 25 yrs. old    History of basal cell carcinoma     Palpitations     PACs, PVCs       PAST SURGICAL HISTORY:    Past Surgical History:   Procedure Laterality Date    APPENDECTOMY      age 14 yrs.    COLONOSCOPY N/A 09/12/2016    Procedure: COLONOSCOPY;  Surgeon: Tanya Blanton MD;  Location: [facility]    HERNIA REPAIR  01/01/2006       FAMILY HISTORY:    Family History   Problem Relation Age of Onset    Other Cancer Father     Hypertension Mother     Hyperlipidemia Mother     Thyroid Disease Mother     Other Cancer Maternal  Grandfather     Colon Cancer No family hx of        Thank you for allowing me to participate in the care of your patient.      Sincerely,     Sugey Valenzuela MD     [facility]  cc:   Sugey Valenzuela MD  [address] [place] [id number]

## 2024-03-08 NOTE — PROGRESS NOTES
"  SERVICE DATE: March 8, 2024      PRIMARY CARE PROVIDER:  Fredi Gonzalez  [address], [id number]  [facility] [address]    REASON FOR VISIT:  Follow-up of precordial chest pain with test results.    HISTORY OF PRESENT ILLNESS:  Muriel Gibson is 48 year old female, known to have type 1 diabetes on insulin, well-controlled with an A1c of 5.6% and normal renal function, hypertension, BMI 25, never tobacco user.    I had seen her recently for left-sided chest pain and family history of cardiomyopathy.  She has completed interval testing.  In the interval, her chest pain is resolved.  She walks her dog for an hour 2 times a day.    CT coronary angiogram shows calcium score of 0, no coronary artery disease.    Transthoracic echocardiogram dated 2/27/2024 showed normal LVEF of 61%, no regional wall motion abnormalities, normal diastolic function, normal right ventricular systolic function, normal atrial size, no valve disease.    Labs reviewed.  Lipid panel shows LDL of 85, normal thyroid panel, normal renal panel with a creatinine of 0.6.    DIAGNOSES/ASSESSMENT:  Noncardiac precordial chest pain, resolved.  Angiographically normal coronary arteries.  Family history of cardiomyopathy.  Muriel's echocardiogram shows normal ventricular function.    PLAN:  Patient reassured about test results.  I congratulated her about her healthy lifestyle.  Follow-up with cardiology, as needed.      Sugey Valenzuela MD      Established patient.   The level of medical decision making during this visit was of moderate complexity.        Vitals: /78   Pulse 54   Ht 1.651 m (5' 5\")   Wt 69.4 kg (153 lb)   SpO2 99%   BMI 25.46 kg/m    Wt Readings from Last 5 Encounters:   03/08/24 69.4 kg (153 lb)   02/07/24 69.4 kg (153 lb)   09/21/23 70.9 kg (156 lb 6.4 oz)   08/10/21 69.9 kg (154 lb)   12/30/19 69.9 kg (154 lb)           CURRENT MEDICATIONS:  Current Outpatient Medications   Medication Sig Dispense Refill    Continuous " Blood Gluc Sensor (DEXCOM G6 SENSOR) MISC CHANGE EVERY 10 DAYS      Continuous Blood Gluc Transmit (DEXCOM G6 TRANSMITTER) MISC USE EVERY 90 DAYS      Glucagon, rDNA, (GLUCAGON EMERGENCY) 1 MG KIT See Admin Instructions      insulin lispro (HUMALOG) 100 UNIT/ML injection Inject Subcutaneous 3 times daily (with meals) 3-5 units after breakfast and dinner, and 1-2 units after lunch per sliding scale BG      lisinopril (ZESTRIL) 10 MG tablet Take 1 tablet (10 mg) by mouth every morning      sertraline (ZOLOFT) 100 MG tablet Take 1 tablet by mouth daily at 2 pm      TOUJEO SOLOSTAR 300 UNIT/ML (1 units dial) pen USE UP TO 50 UNITS PER DAY      EPINEPHrine (ANY BX GENERIC EQUIV) 0.3 MG/0.3ML injection 2-pack Inject 0.3 mLs (0.3 mg) into the muscle once as needed for anaphylaxis (Patient not taking: Reported on 10/13/2021) 1 each 0         ALLERGIES:  Allergies   Allergen Reactions    Azithromycin Nausea and Diarrhea    Cefuroxime Nausea and Diarrhea    Food Swelling     [place]    Lumineux Clean-Fresh Toothpste [Sodium Fluoride] Swelling     Toothpaste with fredi        PAST MEDICAL HISTORY:    Past Medical History:   Diagnosis Date    Anemia     Diabetes mellitus - type 1     since age 25 yrs. old    History of basal cell carcinoma     Palpitations     PACs, PVCs       PAST SURGICAL HISTORY:    Past Surgical History:   Procedure Laterality Date    APPENDECTOMY      age 14 yrs.    COLONOSCOPY N/A 09/12/2016    Procedure: COLONOSCOPY;  Surgeon: Tanya Blanton MD;  Location:  GI    HERNIA REPAIR  01/01/2006       FAMILY HISTORY:    Family History   Problem Relation Age of Onset    Other Cancer Father     Hypertension Mother     Hyperlipidemia Mother     Thyroid Disease Mother     Other Cancer Maternal Grandfather     Colon Cancer No family hx of

## 2024-09-11 ENCOUNTER — HOSPITAL ENCOUNTER (OUTPATIENT)
Dept: MAMMOGRAPHY | Facility: CLINIC | Age: 49
Discharge: HOME | End: 2024-09-11
Attending: OBSTETRICS & GYNECOLOGY | Admitting: OBSTETRICS & GYNECOLOGY
Payer: COMMERCIAL

## 2024-09-11 DIAGNOSIS — Z12.31 VISIT FOR SCREENING MAMMOGRAM: ICD-10-CM

## 2024-09-11 PROCEDURE — 77063 BREAST TOMOSYNTHESIS BI: CPT

## 2024-11-15 ENCOUNTER — TRANSFERRED RECORDS (OUTPATIENT)
Dept: FAMILY MEDICINE | Facility: CLINIC | Age: 49
End: 2024-11-15

## 2025-01-21 ENCOUNTER — TRANSFERRED RECORDS (OUTPATIENT)
Dept: FAMILY MEDICINE | Facility: CLINIC | Age: 50
End: 2025-01-21

## 2025-01-27 ENCOUNTER — OFFICE VISIT (OUTPATIENT)
Dept: FAMILY MEDICINE | Facility: CLINIC | Age: 50
End: 2025-01-27

## 2025-01-27 VITALS
TEMPERATURE: 98.3 F | BODY MASS INDEX: 25.79 KG/M2 | WEIGHT: 155 LBS | DIASTOLIC BLOOD PRESSURE: 72 MMHG | SYSTOLIC BLOOD PRESSURE: 128 MMHG | HEART RATE: 64 BPM

## 2025-01-27 DIAGNOSIS — B02.9 HERPES ZOSTER WITHOUT COMPLICATION: Primary | ICD-10-CM

## 2025-01-27 PROCEDURE — G2211 COMPLEX E/M VISIT ADD ON: HCPCS | Performed by: FAMILY MEDICINE

## 2025-01-27 PROCEDURE — 99213 OFFICE O/P EST LOW 20 MIN: CPT | Performed by: FAMILY MEDICINE

## 2025-01-27 RX ORDER — VALACYCLOVIR HYDROCHLORIDE 1 G/1
1000 TABLET, FILM COATED ORAL 3 TIMES DAILY
Qty: 21 TABLET | Refills: 0 | Status: SHIPPED | OUTPATIENT
Start: 2025-01-27 | End: 2025-02-03

## 2025-01-27 NOTE — PROGRESS NOTES
SUBJECTIVE:   The patient has a 2 day history of a painful rash on the mid back. PMH: generally healthy. Has not had herpes zoster in the past. Pt giana low grade fever and fatigue    OBJECTIVE:  Vital signs are normal, she appears well. Typical zoster lesions noted; vesicles on erythematous bases in clusters on the left mid back and side in a dermatomal pattern.    ASSESSMENT:  Herpes Zoster (shingles)-left side dermatome    PLAN:  The nature of herpes zoster is explained carefully. Lesions should be compressed/soaked with saline, topical antibiotic ointment to any infected lesions; Aloe Vera cream may help minor local pain. Intervention with antiviral agents is extremely helpful early in the course of the disease, less helpful after 2-3 days of symptoms. Postherpetic neuralgia is explained; this may occur especially in the elderly despite every attempt at prevention. Prescription for valacyclovir (Valtrex), which may shorten the course of acute symptoms and reduce the incidence of later neuralgia. The patient understands these issues, and will call as needed for further care.

## 2025-01-30 ENCOUNTER — TELEPHONE (OUTPATIENT)
Dept: FAMILY MEDICINE | Facility: CLINIC | Age: 50
End: 2025-01-30

## 2025-01-30 DIAGNOSIS — B02.9 HERPES ZOSTER WITHOUT COMPLICATION: Primary | ICD-10-CM

## 2025-01-30 RX ORDER — GABAPENTIN 300 MG/1
300 CAPSULE ORAL 2 TIMES DAILY
Qty: 60 CAPSULE | Refills: 0 | Status: SHIPPED | OUTPATIENT
Start: 2025-01-30

## 2025-01-30 NOTE — TELEPHONE ENCOUNTER
Start with gabapentin BID, may need OV if pain continues or needs further med titration  Roge Corral PA-C  [facility]

## 2025-01-30 NOTE — TELEPHONE ENCOUNTER
Pt called stating her shingles has spread larger and the pain has gotten worse. She stated she has been taking ibuprofen and this is not helping with the pain, she is also worried about taking this with her Type 1 diabetes. She is hoping something can be sent in to help her with the pain.    Please advise for [facility] # [phone]    Thanks, [employer] [facility]

## 2025-04-01 ENCOUNTER — OFFICE VISIT (OUTPATIENT)
Dept: FAMILY MEDICINE | Facility: CLINIC | Age: 50
End: 2025-04-01

## 2025-04-01 ENCOUNTER — TRANSFERRED RECORDS (OUTPATIENT)
Dept: FAMILY MEDICINE | Facility: CLINIC | Age: 50
End: 2025-04-01

## 2025-04-01 VITALS
BODY MASS INDEX: 25.56 KG/M2 | TEMPERATURE: 97.1 F | HEIGHT: 65 IN | WEIGHT: 153.4 LBS | SYSTOLIC BLOOD PRESSURE: 110 MMHG | DIASTOLIC BLOOD PRESSURE: 64 MMHG | RESPIRATION RATE: 20 BRPM | HEART RATE: 72 BPM

## 2025-04-01 DIAGNOSIS — R05.3 CHRONIC COUGH: Primary | ICD-10-CM

## 2025-04-01 DIAGNOSIS — R68.83 CHILLS: ICD-10-CM

## 2025-04-01 LAB
% GRANULOCYTES: 52.3 %
HCT VFR BLD AUTO: 39.6 % (ref 35–47)
HEMOGLOBIN: 12.5 G/DL (ref 11.7–15.7)
LYMPHOCYTES NFR BLD AUTO: 39.3 %
MCH RBC QN AUTO: 28.3 PG (ref 26–33)
MCHC RBC AUTO-ENTMCNC: 31.6 G/DL (ref 31–36)
MCV RBC AUTO: 89.7 FL (ref 78–100)
MONOCYTES NFR BLD AUTO: 8.4 %
PLATELET COUNT - QUEST: 177 10^9/L (ref 150–375)
RBC # BLD AUTO: 4.42 10*12/L (ref 3.8–5.2)
WBC # BLD AUTO: 8.7 10*9/L (ref 4–11)

## 2025-04-01 PROCEDURE — 36415 COLL VENOUS BLD VENIPUNCTURE: CPT | Performed by: FAMILY MEDICINE

## 2025-04-01 PROCEDURE — 71046 X-RAY EXAM CHEST 2 VIEWS: CPT | Performed by: FAMILY MEDICINE

## 2025-04-01 PROCEDURE — G2211 COMPLEX E/M VISIT ADD ON: HCPCS | Performed by: FAMILY MEDICINE

## 2025-04-01 PROCEDURE — 85025 COMPLETE CBC W/AUTO DIFF WBC: CPT | Performed by: FAMILY MEDICINE

## 2025-04-01 PROCEDURE — 99213 OFFICE O/P EST LOW 20 MIN: CPT | Performed by: FAMILY MEDICINE

## 2025-04-01 RX ORDER — INSULIN LISPRO 100 [IU]/ML
INJECTION, SOLUTION INTRAVENOUS; SUBCUTANEOUS
COMMUNITY
Start: 2025-03-20

## 2025-04-01 NOTE — PROGRESS NOTES
SUBJECTIVE:   Muriel Gibson is a 49 year old female who complains of dry cough, occasional chills, and fatigue for 2 months- mild symptoms come and go, not worsening. She denies a history of productive cough, fever, sweats, myalgias, shortness of breath, dizzyness, nausea, vomiting, and chest pain and denies a history of asthma. Patient does not smoke cigarettes.    Pt actually notes cough for 1 year, will wake her from sleep    Pt denies GERD symptoms , no hx allergies, no hx asthma    I do note she takes lisinopril    Patient Active Problem List   Diagnosis    DKA (diabetic ketoacidoses)    CARDIOVASCULAR SCREENING; LDL GOAL LESS THAN 100    Type 1 diabetes, HbA1c goal < 7% (H)-Dr Colvin    Panic disorder    Basal cell carcinoma of back    Palpitations    ACP (advance care planning)       Past Medical History:   Diagnosis Date    Anemia     Diabetes mellitus - type 1     since age 25 yrs. old    History of basal cell carcinoma     Palpitations     PACs, PVCs       Family History   Problem Relation Age of Onset    Other Cancer Father     Hypertension Mother     Hyperlipidemia Mother     Thyroid Disease Mother     Other Cancer Maternal Grandfather     Colon Cancer No family hx of        Social History     Socioeconomic History    Marital status:      Spouse name: Not on file    Number of children: 1    Years of education: Not on file    Highest education level: Not on file   Occupational History    Not on file   Tobacco Use    Smoking status: Never     Passive exposure: Never    Smokeless tobacco: Never   Substance and Sexual Activity    Alcohol use: No     Comment: very rarely    Drug use: No    Sexual activity: Yes   Other Topics Concern    Parent/sibling w/ CABG, MI or angioplasty before 65F 55M? Not Asked     Service Not Asked    Blood Transfusions Not Asked    Caffeine Concern Not Asked    Occupational Exposure Not Asked    Hobby Hazards Not Asked    Sleep Concern Not Asked    Stress Concern Not  Asked    Weight Concern Not Asked    Special Diet Yes     Comment: low carb due to diabetic    Back Care Not Asked    Exercise Yes     Comment: walking everyday    Bike Helmet Not Asked    Seat Belt Not Asked    Self-Exams Not Asked   Social History Narrative    Not on file     Social Drivers of Health     Financial Resource Strain: Not on file   Food Insecurity: Not on file   Transportation Needs: Not on file   Physical Activity: Not on file   Stress: Not on file   Social Connections: Not on file   Interpersonal Safety: Not on file   Housing Stability: Not on file       Past Surgical History:   Procedure Laterality Date    APPENDECTOMY      age 14 yrs.    COLONOSCOPY N/A 09/12/2016    Procedure: COLONOSCOPY;  Surgeon: Tanya Blanton MD;  Location:  GI    HERNIA REPAIR  01/01/2006       Current Outpatient Medications   Medication Sig Dispense Refill    Continuous Blood Gluc Sensor (DEXCOM G6 SENSOR) MISC CHANGE EVERY 10 DAYS      Continuous Blood Gluc Transmit (DEXCOM G6 TRANSMITTER) MISC USE EVERY 90 DAYS      insulin lispro (HUMALOG) 100 UNIT/ML injection Inject Subcutaneous 3 times daily (with meals) 3-5 units after breakfast and dinner, and 1-2 units after lunch per sliding scale BG      lisinopril (ZESTRIL) 10 MG tablet Take 1 tablet (10 mg) by mouth every morning      sertraline (ZOLOFT) 100 MG tablet Take 1 tablet by mouth daily at 2 pm      TOUJEO SOLOSTAR 300 UNIT/ML (1 units dial) pen USE UP TO 50 UNITS PER DAY      EPINEPHrine (ANY BX GENERIC EQUIV) 0.3 MG/0.3ML injection 2-pack Inject 0.3 mLs (0.3 mg) into the muscle once as needed for anaphylaxis (Patient not taking: Reported on 10/13/2021) 1 each 0    Glucagon, rDNA, (GLUCAGON EMERGENCY) 1 MG KIT See Admin Instructions       No current facility-administered medications for this visit.        Allergies: Azithromycin, Cefuroxime, Food, and Lumineux clean-fresh toothpste [sodium fluoride]      Immunization History   Administered Date(s)  "Administered    COVID-19 MONOVALENT 12+ (Pfizer) 01/26/2021, 02/16/2021, 11/19/2021    MMR (MMRII) 02/27/2002    TD,PF 7+ (Tenivac) 07/24/2020    TDAP Vaccine (Adacel) 01/01/2009    TDAP Vaccine (Boostrix) 11/15/2012         OBJECTIVE:/64 (BP Location: Right arm, Patient Position: Chair, Cuff Size: Adult Regular)   Pulse 72   Temp 97.1  F (36.2  C) (Temporal)   Resp 20   Ht 1.651 m (5' 5\")   Wt 69.6 kg (153 lb 6.4 oz)   BMI 25.53 kg/m     She appears well, vital signs are as noted by the nurse. Ears normal.  Throat and pharynx normal.  Neck supple. No adenopathy in the neck. Nose is congested. Sinuses non tender. The chest is clear, without wheezes or rales.    CXR neg on my read,   Will await radiology over-read and contact patient if any discrepancies     Lab Results   Component Value Date    WBC 8.7 04/01/2025     Lab Results   Component Value Date    RBC 4.42 04/01/2025     Lab Results   Component Value Date    HGB 12.5 04/01/2025     Lab Results   Component Value Date    HCT 39.6 04/01/2025     No components found for: \"MCT\"  Lab Results   Component Value Date    MCV 89.7 04/01/2025     Lab Results   Component Value Date    MCH 28.3 04/01/2025     Lab Results   Component Value Date    MCHC 31.6 04/01/2025     Lab Results   Component Value Date    RDW 12.9 09/27/2016     Lab Results   Component Value Date     04/01/2025     09/27/2016           ASSESSMENT: /PLAN:   (R05.3) Chronic cough  (primary encounter diagnosis)  Comment: differential diagnosis includes infection vs. allergies vs. asthma vs. gastroesophageal reflux disease vs. medication side effect - suspect either ACE-I cough vs allergic issue  Plan: XR Chest 2 Views, HEMOGRAM PLATELET DIFF ([facility])        We agree to have pt stop lisinopril for 2 weks, if cough resolves consider other option for renoprotection and BP    She will let me know    If not better ocnsider Flonase trial vs allergy eval    (R68.83) Chills  Comment: normal " CBC  Plan: HEMOGRAM PLATELET DIFF ([facility])        Observe for now

## 2025-04-01 NOTE — NURSING NOTE
Muriel Gibson is here for a consult for chills and fatigue since December.    Questioned patient about current smoking habits.  Pt. has never smoked.  PULSE regular  My Chart: active  CLASSIFICATION OF OVERWEIGHT AND OBESITY BY BMI                        Obesity Class           BMI(kg/m2)  Underweight                                    < 18.5  Normal                                         18.5-24.9  Overweight                                     25.0-29.9  OBESITY                     I                  30.0-34.9                             II                 35.0-39.9  EXTREME OBESITY             III                >40                         Body mass index is 25.53 kg/m .    Pre-visit planning  Immunizations - up to date  Colonoscopy - is up to date  Mammogram - is up to date  Asthma -   PHQ9 -    АЛЕКСАНДР-[id number] -      The patient has verbalized that it is ok to leave a detailed voice message on the patient's cell phone with results/recommendations from this visit.

## 2025-04-02 ENCOUNTER — TRANSFERRED RECORDS (OUTPATIENT)
Dept: FAMILY MEDICINE | Facility: CLINIC | Age: 50
End: 2025-04-02

## 2025-09-03 ENCOUNTER — OFFICE VISIT (OUTPATIENT)
Dept: CARDIOLOGY | Facility: CLINIC | Age: 50
End: 2025-09-03
Payer: COMMERCIAL

## 2025-09-03 VITALS
DIASTOLIC BLOOD PRESSURE: 68 MMHG | BODY MASS INDEX: 25.26 KG/M2 | HEART RATE: 68 BPM | SYSTOLIC BLOOD PRESSURE: 112 MMHG | HEIGHT: 65 IN | WEIGHT: 151.6 LBS

## 2025-09-03 DIAGNOSIS — I10 PRIMARY HYPERTENSION: ICD-10-CM

## 2025-09-03 DIAGNOSIS — I83.93: Primary | ICD-10-CM

## 2025-09-03 DIAGNOSIS — E10.9 TYPE 1 DIABETES MELLITUS WITHOUT COMPLICATION (H): ICD-10-CM

## (undated) RX ORDER — NITROGLYCERIN 0.4 MG/1
TABLET SUBLINGUAL
Status: DISPENSED
Start: 2024-02-27

## (undated) RX ORDER — METOPROLOL TARTRATE 50 MG
TABLET ORAL
Status: DISPENSED
Start: 2024-02-27

## (undated) RX ORDER — IVABRADINE 5 MG/1
TABLET, FILM COATED ORAL
Status: DISPENSED
Start: 2024-02-27